# Patient Record
Sex: FEMALE | Race: WHITE | Employment: FULL TIME | URBAN - METROPOLITAN AREA
[De-identification: names, ages, dates, MRNs, and addresses within clinical notes are randomized per-mention and may not be internally consistent; named-entity substitution may affect disease eponyms.]

---

## 2017-01-09 ENCOUNTER — OFFICE VISIT (OUTPATIENT)
Dept: FAMILY MEDICINE CLINIC | Age: 47
End: 2017-01-09

## 2017-01-09 VITALS
WEIGHT: 203 LBS | HEIGHT: 67 IN | OXYGEN SATURATION: 96 % | BODY MASS INDEX: 31.86 KG/M2 | TEMPERATURE: 97.5 F | SYSTOLIC BLOOD PRESSURE: 143 MMHG | HEART RATE: 92 BPM | RESPIRATION RATE: 16 BRPM | DIASTOLIC BLOOD PRESSURE: 73 MMHG

## 2017-01-09 DIAGNOSIS — Z72.0 TOBACCO ABUSE DISORDER: ICD-10-CM

## 2017-01-09 DIAGNOSIS — E11.9 TYPE 2 DIABETES MELLITUS WITHOUT COMPLICATION, WITH LONG-TERM CURRENT USE OF INSULIN (HCC): ICD-10-CM

## 2017-01-09 DIAGNOSIS — Z79.4 TYPE 2 DIABETES MELLITUS WITHOUT COMPLICATION, WITH LONG-TERM CURRENT USE OF INSULIN (HCC): ICD-10-CM

## 2017-01-09 DIAGNOSIS — E11.9 TYPE 2 DIABETES MELLITUS WITHOUT COMPLICATION, WITH LONG-TERM CURRENT USE OF INSULIN (HCC): Primary | ICD-10-CM

## 2017-01-09 DIAGNOSIS — M17.0 PRIMARY OSTEOARTHRITIS OF BOTH KNEES: ICD-10-CM

## 2017-01-09 DIAGNOSIS — Z79.4 TYPE 2 DIABETES MELLITUS WITHOUT COMPLICATION, WITH LONG-TERM CURRENT USE OF INSULIN (HCC): Primary | ICD-10-CM

## 2017-01-09 LAB — HBA1C MFR BLD HPLC: 6.8 %

## 2017-01-09 NOTE — MR AVS SNAPSHOT
Visit Information Date & Time Provider Department Dept. Phone Encounter #  
 1/9/2017  3:20 PM Adwoa Sprague MD [facility] [place] at [address] [id number] Follow-up Instructions Return in about 3 months (around 4/9/2017), or if symptoms worsen or fail to improve. Your Appointments 1/9/2017  3:20 PM  
ROUTINE CARE with Adwoa Sprague MD  
[facility] [place] at [facility] Appt Note: 2m fu; r/s  
 [address] [address] [address] [facility] Upcoming Health Maintenance Date Due  
 LIPID PANEL Q1 1970 FOOT EXAM Q1 7/24/1980 EYE EXAM RETINAL OR DILATED Q1 7/24/1980 PAP AKA CERVICAL CYTOLOGY 7/24/1991 HEMOGLOBIN A1C Q6M 3/21/2017 MICROALBUMIN Q1 10/20/2017 DTaP/Tdap/Td series (2 - Td) 10/20/2026 Allergies as of 1/9/2017  Review Complete On: 1/9/2017 By: Adwoa Sprague MD  
  
 Severity Noted Reaction Type Reactions Pcn [Penicillins]  09/21/2016    Hives Current Immunizations  Never Reviewed No immunizations on file. Not reviewed this visit You Were Diagnosed With   
  
 Codes Comments Type 2 diabetes mellitus without complication, with long-term current use of insulin (HCC)    -  Primary ICD-10-CM: E11.9, Z79.4 ICD-9-CM: 250.00, V58.67 Primary osteoarthritis of both knees     ICD-10-CM: M17.0 ICD-9-CM: 715.16 Tobacco abuse disorder     ICD-10-CM: Z72.0 ICD-9-CM: 305.1 Vitals BP Pulse Temp Resp Height(growth percentile) Weight(growth percentile) 143/73 (BP 1 Location: Left arm, BP Patient Position: Sitting) 92 97.5 °F (36.4 °C) (Oral) 16 5' 6.8\" (1.697 m) 203 lb (92.1 kg) SpO2 BMI OB Status Smoking Status 96% 31.98 kg/m2 Premenopausal Current Every Day Smoker Vitals History BMI and BSA Data Body Mass Index Body Surface Area 31.98 kg/m 2 2.08 m 2 Preferred Pharmacy Pharmacy Name Phone [facility] [address], [address] [facility]. [phone] Your Updated Medication List  
  
   
This list is accurate as of: 1/9/17  2:54 PM.  Always use your most recent med list.  
  
  
  
  
 glucose blood VI test strips strip Commonly known as:  FREESTYLE INSULINX TEST STRIPS Test blood sugars twice daily  Diagnosis E 11.65  
  
 insulin glargine 300 unit/mL (1.5 mL) Inpn Commonly known as:  TOUJEO SOLOSTAR  
35 Units by SubCUTAneous route daily. Insulin Syringe-Needle U-100 0.5 mL 29 gauge x 1/2\" Syrg Commonly known as:  INSULIN SYRINGE ULTRAFINE Use as instructed Lancets Misc Use as instructed  
  
 metFORMIN 1,000 mg tablet Commonly known as:  GLUCOPHAGE Take 1 Tab by mouth two (2) times daily (with meals). We Performed the Following AMB POC HEMOGLOBIN A1C [96619 CPT(R)]  DIABETES EYE EXAM [HM6 Custom]  DIABETES FOOT EXAM [HM7 Custom] REFERRAL TO PHYSICAL THERAPY [TZB81 Custom] Comments:  
 Please evaluate patient for:  bilat knee oa Follow-up Instructions Return in about 3 months (around 4/9/2017), or if symptoms worsen or fail to improve. To-Do List   
 01/09/2017 Lab:  LIPID PANEL   
  
 01/09/2017 Lab:  METABOLIC PANEL, COMPREHENSIVE   
  
 01/09/2017 Imaging:  XR KNEE LT MAX 2 VWS   
  
 01/09/2017 Imaging:  XR KNEE RT MAX 2 VWS Referral Information Referral ID Referred By Referred To  
  
 [id number] [place] Not Available Visits Status Start Date End Date 1 New Request 1/9/17 1/9/18 If your referral has a status of pending review or denied, additional information will be sent to support the outcome of this decision. Introducing [facility] & HEALTH SERVICES!    
 Estefania Wynn introduces [employer] patient portal. Now you can access parts of your medical record, email your doctor's office, and request medication refills online. 1. In your internet browser, go to [email or web address] 2. Click on the First Time User? Click Here link in the Sign In box. You will see the New Member Sign Up page. 3. Enter your Amplify[employer]LA Access Code exactly as it appears below. You will not need to use this code after youve completed the sign-up process. If you do not sign up before the expiration date, you must request a new code. · Am[employer] Access Code: [email or web address] Expires: 4/9/2017  2:37 PM 
 
4. Enter the last four digits of your Social Security Number (xxxx) and Date of Birth (mm/dd/yyyy) as indicated and click Submit. You will be taken to the next sign-up page. 5. Create a Amplify.LA ID. This will be your Amplify[employer] login ID and cannot be changed, so think of one that is secure and easy to remember. 6. Create a Amplify[employer]LA password. You can change your password at any time. 7. Enter your Password Reset Question and Answer. This can be used at a later time if you forget your password. 8. Enter your e-mail address. You will receive e-mail notification when new information is available in [address] 9. Click Sign Up. You can now view and download portions of your medical record. 10. Click the Download Summary menu link to download a portable copy of your medical information. If you have questions, please visit the Frequently Asked Questions section of the Am[employer] website. Remember, [employer] is NOT to be used for urgent needs. For medical emergencies, dial 911. Now available from your iPhone and Android! Please provide this summary of care documentation to your next provider. Your primary care clinician is listed as NONE. If you have any questions after today's visit, please call [phone].

## 2017-01-09 NOTE — PROGRESS NOTES
Chief Complaint   Patient presents with    Diabetes     3 month follow-up   Discuss knee pain x 1 month   Room 4

## 2017-01-09 NOTE — PROGRESS NOTES
Subjective:     Chief Complaint   Patient presents with    Diabetes     3 month follow-up      She  is a 55 y.o. female who presents for evaluation of:     Diabetes Mellitus: Newer Diagnosis  Doing excellent on Toujeo and Metformin  Reports no polyuria or polydipsia, no chest pain, dyspnea or TIA's, no numbness, tingling or pain in extremities, last eye exam approximately > 1 yr ago. Exercises regularly with walking. Compliant with diabetic diet. Avoids sodas and sweet teas. Avoids fast food. Limits carbs. Pt is a smoker but working on quitting - smoking 1/2-1ppd now. Drinks EtOH about 3-6 drinks over weekend. Lab Results   Component Value Date/Time    Hemoglobin A1c (POC) 6.8 01/09/2017 02:45 PM    Hemoglobin A1c 10.9 09/21/2016 07:06 PM    Microalb/Creat ratio (ug/mg creat.) <3.8 10/20/2016 02:38 PM    Creatinine 0.66 09/26/2016 05:34 AM      Lab Results   Component Value Date/Time    GFR est AA >60 09/26/2016 05:34 AM    GFR est non-AA >60 09/26/2016 05:34 AM      No results found for: TSH, TSHEXT, TSHEXT      ROS  Gen - no fever/chills  Resp - no dyspnea or cough  CV - no chest pain or BAR  Msk - Knee pain x 1 month, long hx of dance classes with no specific major injury, no giving way, pain is mostly with stairs and is anterior and superior. Rest per HPI    Past Medical History   Diagnosis Date    Chronic obstructive pulmonary disease ([address]      possible, put on a rtrial of advair - no PFT's    [facility]     H/O seasonal allergies      History reviewed. No pertinent past surgical history. Current Outpatient Prescriptions on File Prior to Visit   Medication Sig Dispense Refill    metFORMIN (GLUCOPHAGE) 1,000 mg tablet Take 1 Tab by mouth two (2) times daily (with meals). 60 Tab 1    insulin glargine (TOUJEO SOLOSTAR) 300 unit/mL (1.5 mL) inpn 35 Units by SubCUTAneous route daily. (Patient taking differently: 38 Units by SubCUTAneous route daily. ) 1 Adjustable Dose Pre-filled Pen Syringe 1  Lancets misc Use as instructed 1 Package 1    glucose blood VI test strips (FREESTYLE INSULINX TEST STRIPS) strip Test blood sugars twice daily  Diagnosis E 11.65 300 Strip 1    Insulin Syringe-Needle U-100 (INSULIN SYRINGE ULTRAFINE) 0.5 mL 29 gauge x 1/2\" syrg Use as instructed 100 Syringe 1     No current facility-administered medications on file prior to visit. Objective:     Vitals:    01/09/17 1429   BP: 143/73   Pulse: 92   Resp: 16   Temp: 97.5 °F (36.4 °C)   TempSrc: Oral   SpO2: 96%   Weight: 203 lb (92.1 kg)   Height: 5' 6.8\" (1.697 m)     Physical Examination:  General appearance - alert, well appearing, and in no distress  Eyes -sclera anicteric  Neck - supple, no significant adenopathy, no thyromegaly, no bruits  Chest - clear to auscultation, no wheezes, rales or rhonchi, symmetric air entry  Heart - normal rate, regular rhythm, normal S1, S2, no murmurs, rubs, clicks or gallops  Neurological - alert, oriented, no focal findings or movement disorder noted  Extr - no edema, Bilat knees with mild crepitus, FROM, neg McMurrays, lachmans, and post drawer, no joint line tenderness    Assessment/ Plan:   Alfred Jasso was seen today for diabetes. Diagnoses and all orders for this visit:    Type 2 diabetes mellitus without complication, with long-term current use of insulin ([address] - great control since new dx. Eye exam to be done by brother in law who is an optometrist  -     AMB POC HEMOGLOBIN Y9G  -     1 [facility]; Future  -     LIPID PANEL; Future  -      DIABETES EYE EXAM  -      DIABETES FOOT EXAM    Primary osteoarthritis of both knees - x-rays, NSAIDs, PT  -     XR KNEE LT MAX 2 VWS; Future  -     XR KNEE RT MAX 2 VWS; Future  -     REFERRAL TO PHYSICAL THERAPY    Tobacco abuse disorder - ct working on smoking cessation    I have discussed the diagnosis with the patient and the intended plan as seen in the above orders.   The patient has received an after-visit summary and questions were answered concerning future plans. I have discussed medication side effects and warnings with the patient as well. The patient verbalizes understanding and agreement with the plan. Follow-up Disposition:  Return in about 3 months (around 4/9/2017), or if symptoms worsen or fail to improve.

## 2017-01-27 DIAGNOSIS — Z79.4 TYPE 2 DIABETES MELLITUS WITHOUT COMPLICATION, WITH LONG-TERM CURRENT USE OF INSULIN (HCC): ICD-10-CM

## 2017-01-27 DIAGNOSIS — E11.9 TYPE 2 DIABETES MELLITUS WITHOUT COMPLICATION, WITH LONG-TERM CURRENT USE OF INSULIN (HCC): ICD-10-CM

## 2017-04-04 DIAGNOSIS — E11.9 TYPE 2 DIABETES MELLITUS WITHOUT COMPLICATION, WITH LONG-TERM CURRENT USE OF INSULIN (HCC): ICD-10-CM

## 2017-04-04 DIAGNOSIS — Z79.4 TYPE 2 DIABETES MELLITUS WITHOUT COMPLICATION, WITH LONG-TERM CURRENT USE OF INSULIN (HCC): ICD-10-CM

## 2017-04-04 RX ORDER — METFORMIN HYDROCHLORIDE 1000 MG/1
TABLET ORAL
Qty: 60 TAB | Refills: 0 | Status: SHIPPED | COMMUNITY
Start: 2017-04-04 | End: 2017-05-08 | Stop reason: SDUPTHER

## 2017-05-08 ENCOUNTER — OFFICE VISIT (OUTPATIENT)
Dept: FAMILY MEDICINE CLINIC | Age: 47
End: 2017-05-08

## 2017-05-08 VITALS
RESPIRATION RATE: 16 BRPM | OXYGEN SATURATION: 97 % | BODY MASS INDEX: 31.39 KG/M2 | HEIGHT: 67 IN | HEART RATE: 90 BPM | TEMPERATURE: 98.4 F | DIASTOLIC BLOOD PRESSURE: 70 MMHG | WEIGHT: 200 LBS | SYSTOLIC BLOOD PRESSURE: 116 MMHG

## 2017-05-08 DIAGNOSIS — Z72.0 TOBACCO ABUSE DISORDER: ICD-10-CM

## 2017-05-08 DIAGNOSIS — E66.09 NON MORBID OBESITY DUE TO EXCESS CALORIES: ICD-10-CM

## 2017-05-08 DIAGNOSIS — E11.9 TYPE 2 DIABETES MELLITUS WITHOUT COMPLICATION, WITH LONG-TERM CURRENT USE OF INSULIN (HCC): Primary | ICD-10-CM

## 2017-05-08 DIAGNOSIS — S46.211A BICEPS STRAIN, RIGHT, INITIAL ENCOUNTER: ICD-10-CM

## 2017-05-08 DIAGNOSIS — Z79.4 TYPE 2 DIABETES MELLITUS WITHOUT COMPLICATION, WITH LONG-TERM CURRENT USE OF INSULIN (HCC): Primary | ICD-10-CM

## 2017-05-08 LAB — HBA1C MFR BLD HPLC: 6.3 %

## 2017-05-08 RX ORDER — METFORMIN HYDROCHLORIDE 1000 MG/1
TABLET ORAL
Qty: 60 TAB | Refills: 5 | Status: SHIPPED | OUTPATIENT
Start: 2017-05-08 | End: 2017-09-08 | Stop reason: SDUPTHER

## 2017-05-08 NOTE — PROGRESS NOTES
Chief Complaint   Patient presents with    Diabetes     3 month follow-up   Mother passed away five weeks ago  Discuss right arm pain with movement  Room 4

## 2017-05-08 NOTE — PROGRESS NOTES
Subjective:     Chief Complaint   Patient presents with    Diabetes     3 month follow-up      She  is a 55 y.o. female who presents for evaluation of:     Doing ok today. Mom  recently after long werner with Alzheimers Dementia. Diabetes Mellitus: Newer Diagnosis  Doing excellent on Toujeo and Metformin  Reports no polyuria or polydipsia, no chest pain, dyspnea or TIA's, no numbness, tingling or pain in extremities, last eye exam approximately > 1 yr ago. Exercises regularly with walking. Compliant with diabetic diet. Avoids sodas and sweet teas. Avoids fast food. Limits carbs. Pt is a smoker but working on quitting - smoking 1/2-1ppd now. Drinks EtOH about 3-6 drinks over weekend. Lab Results   Component Value Date/Time    Hemoglobin A1c (POC) 6.3 2017 04:20 PM    Hemoglobin A1c (POC) 6.8 2017 02:45 PM    Hemoglobin A1c 10.9 2016 07:06 PM    Microalb/Creat ratio (ug/mg creat.) <3.8 10/20/2016 02:38 PM    Creatinine 0.66 2016 05:34 AM      Lab Results   Component Value Date/Time    GFR est AA >60 2016 05:34 AM    GFR est non-AA >60 2016 05:34 AM      No results found for: TSH, TSHEXT, TSHEXT      ROS  Gen - no fever/chills  Resp - no dyspnea or cough  CV - no chest pain or BAR  Msk - R biceps pain x 4 days. Better with rest and ibuprofen. No injury. Rest per HPI    Past Medical History:   Diagnosis Date    Chronic obstructive [facility] disease ([facility]     possible, put on a rtrial of advair - no PFT's    Diabetes ([facility] [facility]     H/O seasonal allergies      History reviewed. No pertinent surgical history. Current Outpatient Prescriptions on File Prior to Visit   Medication Sig Dispense Refill    insulin glargine (TOUJEO SOLOSTAR) 300 unit/mL (1.5 mL) inpn 38 Units by SubCUTAneous route daily.  1 Adjustable Dose Pre-filled Pen Syringe 5    Insulin Syringe-Needle U-100 (INSULIN SYRINGE ULTRAFINE) 0.5 mL 29 gauge x 1/2\" syrg Use as instructed 100 Syringe 1    Lancets misc Use as instructed 1 Package 1    glucose blood VI test strips (FREESTYLE INSULINX TEST STRIPS) strip Test blood sugars twice daily  Diagnosis E 11.65 300 Strip 1     No current facility-administered medications on file prior to visit. Objective:     Vitals:    05/08/17 1611   BP: 116/70   Pulse: 90   Resp: 16   Temp: 98.4 °F (36.9 °C)   TempSrc: Oral   SpO2: 97%   Weight: 200 lb (90.7 kg)   Height: 5' 6.8\" (1.697 m)     Physical Examination:  General appearance - alert, well appearing, and in no distress  Eyes -sclera anicteric  Neck - supple, no significant adenopathy, no thyromegaly, no bruits  Chest - clear to auscultation, no wheezes, rales or rhonchi, symmetric air entry  Heart - normal rate, regular rhythm, normal S1, S2, no murmurs, rubs, clicks or gallops  Neurological - alert, oriented, no focal findings or movement disorder noted  Extr - no edema  Msk - mild ttp over bicipital groove, mild pain with bicep flexion, nml ROM    Assessment/ Plan:   Toribio Del Toro was seen today for diabetes. Diagnoses and all orders for this visit:    Type 2 diabetes mellitus without complication, with long-term current use of insulin (HCC) -excellent control with Metformin and Toujeo  -     AMB POC HEMOGLOBIN A1C  -     metFORMIN (GLUCOPHAGE) 1,000 mg tablet; TAKE ONE TABLET BY MOUTH TWICE A DAY WITH MEALS    Tobacco abuse disorder - working on this    Biceps strain, right, initial encounter - rest and NSAIDs prn    Non morbid obesity due to excess calories - working on exercise and diet    I have discussed the diagnosis with the patient and the intended plan as seen in the above orders. The patient has received an after-visit summary and questions were answered concerning future plans. I have discussed medication side effects and warnings with the patient as well. The patient verbalizes understanding and agreement with the plan.     Follow-up Disposition:  Return in about 3 months (around 8/8/2017), or if symptoms worsen or fail to improve.

## 2017-05-08 NOTE — MR AVS SNAPSHOT
Visit Information Date & Time Provider Department Dept. Phone Encounter #  
 5/8/2017  4:00 PM Shawnee Hunter MD [facility] at [address] [id number] Upcoming Health Maintenance Date Due  
 LIPID PANEL Q1 1970 EYE EXAM RETINAL OR DILATED Q1 7/24/1980 PAP AKA CERVICAL CYTOLOGY 7/24/1991 HEMOGLOBIN A1C Q6M 7/9/2017 INFLUENZA AGE 9 TO ADULT 8/1/2017 MICROALBUMIN Q1 10/20/2017 FOOT EXAM Q1 1/9/2018 DTaP/Tdap/Td series (2 - Td) 10/20/2026 Allergies as of 5/8/2017  Review Complete On: 5/8/2017 By: Shawnee Hunter MD  
  
 Severity Noted Reaction Type Reactions Pcn [Penicillins]  09/21/2016    Hives Current Immunizations  Never Reviewed No immunizations on file. Not reviewed this visit You Were Diagnosed With   
  
 Codes Comments Type 2 diabetes mellitus without complication, with long-term current use of insulin (HCC)    -  Primary ICD-10-CM: E11.9, Z79.4 ICD-9-CM: 250.00, V58.67 Tobacco abuse disorder     ICD-10-CM: Z72.0 ICD-9-CM: 305.1 Vitals BP Pulse Temp Resp Height(growth percentile) Weight(growth percentile) 116/70 (BP 1 Location: Left arm, BP Patient Position: Sitting) 90 98.4 °F (36.9 °C) (Oral) 16 5' 6.8\" (1.697 m) 200 lb (90.7 kg) SpO2 BMI OB Status Smoking Status 97% 31.51 kg/m2 Premenopausal Current Every Day Smoker Vitals History BMI and BSA Data Body Mass Index Body Surface Area  
 31.51 kg/m 2 2.07 m 2 Preferred Pharmacy Pharmacy Name Phone [facility] [address], [address] [place] [address]. [phone] Your Updated Medication List  
  
   
This list is accurate as of: 5/8/17  5:01 PM.  Always use your most recent med list.  
  
  
  
  
 glucose blood VI test strips strip Commonly known as:  FREESTYLE INSULINX TEST STRIPS Test blood sugars twice daily  Diagnosis E 11.65  
  
 insulin glargine 300 unit/mL (1.5 mL) Inpn Commonly known as:  TOUJEO SOLOSTAR  
38 Units by SubCUTAneous route daily. Insulin Syringe-Needle U-100 0.5 mL 29 gauge x 1/2\" Syrg Commonly known as:  INSULIN SYRINGE ULTRAFINE Use as instructed Lancets Misc Use as instructed  
  
 metFORMIN 1,000 mg tablet Commonly known as:  GLUCOPHAGE  
TAKE ONE TABLET BY MOUTH TWICE A DAY WITH MEALS Prescriptions Sent to Pharmacy Refills  
 metFORMIN (GLUCOPHAGE) 1,000 mg tablet 5 Sig: TAKE ONE TABLET BY MOUTH TWICE A DAY WITH MEALS Class: Normal  
 Pharmacy: [facility] [address] [place], [address] [place] [address] RD.  #: [phone] We Performed the Following AMB POC HEMOGLOBIN A1C [02180 CPT(R)] Introducing [facility] & HEALTH SERVICES! [address] [facility] introduces [employer] patient portal. Now you can access parts of your medical record, email your doctor's office, and request medication refills online. 1. In your internet browser, go to [email or web address] 2. Click on the First Time User? Click Here link in the Sign In box. You will see the New Member Sign Up page. 3. Enter your [employer] Access Code exactly as it appears below. You will not need to use this code after youve completed the sign-up process. If you do not sign up before the expiration date, you must request a new code. · [employer] Access Code: [email or web address] Expires: 8/6/2017  5:01 PM 
 
4. Enter the last four digits of your Social Security Number (xxxx) and Date of Birth (mm/dd/yyyy) as indicated and click Submit. You will be taken to the next sign-up page. 5. Create a [employer]ot ID. This will be your [employer] login ID and cannot be changed, so think of one that is secure and easy to remember. 6. Create a [employer] password. You can change your password at any time. 7. Enter your Password Reset Question and Answer. This can be used at a later time if you forget your password. 8. Enter your e-mail address. You will receive e-mail notification when new information is available in [address] 9. Click Sign Up. You can now view and download portions of your medical record. 10. Click the Download Summary menu link to download a portable copy of your medical information. If you have questions, please visit the Frequently Asked Questions section of the [employer] website. Remember, [employer] is NOT to be used for urgent needs. For medical emergencies, dial 911. Now available from your iPhone and Android! Please provide this summary of care documentation to your next provider. Your primary care clinician is listed as Corinna James. If you have any questions after today's visit, please call [phone].

## 2017-08-21 ENCOUNTER — OFFICE VISIT (OUTPATIENT)
Dept: FAMILY MEDICINE CLINIC | Age: 47
End: 2017-08-21

## 2017-08-21 VITALS
HEIGHT: 67 IN | RESPIRATION RATE: 16 BRPM | DIASTOLIC BLOOD PRESSURE: 64 MMHG | TEMPERATURE: 98.7 F | SYSTOLIC BLOOD PRESSURE: 102 MMHG | OXYGEN SATURATION: 96 % | WEIGHT: 203 LBS | HEART RATE: 99 BPM | BODY MASS INDEX: 31.86 KG/M2

## 2017-08-21 DIAGNOSIS — Z72.0 TOBACCO ABUSE DISORDER: ICD-10-CM

## 2017-08-21 DIAGNOSIS — M67.441 GANGLION CYST OF FINGER OF RIGHT HAND: ICD-10-CM

## 2017-08-21 DIAGNOSIS — Z79.4 TYPE 2 DIABETES MELLITUS WITHOUT COMPLICATION, WITH LONG-TERM CURRENT USE OF INSULIN (HCC): Primary | ICD-10-CM

## 2017-08-21 DIAGNOSIS — E11.9 TYPE 2 DIABETES MELLITUS WITHOUT COMPLICATION, WITH LONG-TERM CURRENT USE OF INSULIN (HCC): Primary | ICD-10-CM

## 2017-08-21 DIAGNOSIS — Z00.00 WELL ADULT EXAM: ICD-10-CM

## 2017-08-21 DIAGNOSIS — Z79.899 ENCOUNTER FOR LONG-TERM (CURRENT) USE OF MEDICATIONS: ICD-10-CM

## 2017-08-21 DIAGNOSIS — E66.09 NON MORBID OBESITY DUE TO EXCESS CALORIES: ICD-10-CM

## 2017-08-21 NOTE — PROGRESS NOTES
Subjective:     Chief Complaint   Patient presents with    Diabetes     3 month follow-up      She  is a 52 y.o. female who presents for evaluation of:     Doing ok today. Diabetes Mellitus: Doing excellent on Toujeo and Metformin  Reports no polyuria or polydipsia, no chest pain, dyspnea or TIA's, no numbness, tingling or pain in extremities, last eye exam approximately > 1 yr ago. Exercises regularly with walking. Compliant with diabetic diet. Avoids sodas and sweet teas. Avoids fast food. Limits carbs. Pt is a smoker but working on quitting - smoking 1/2-1ppd now. Drinks EtOH about 3-6 drinks over weekend. Lab Results   Component Value Date/Time    Hemoglobin A1c (POC) 6.3 05/08/2017 04:20 PM    Hemoglobin A1c (POC) 6.8 01/09/2017 02:45 PM    Hemoglobin A1c 10.9 09/21/2016 07:06 PM    Microalb/Creat ratio (ug/mg creat.) <3.8 10/20/2016 02:38 PM    Creatinine 0.66 09/26/2016 05:34 AM      Lab Results   Component Value Date/Time    GFR est AA >60 09/26/2016 05:34 AM    GFR est non-AA >60 09/26/2016 05:34 AM      No results found for: TSH, TSHEXT, TSHEXT      ROS  Gen - no fever/chills  Resp - no dyspnea or cough  CV - no chest pain or BAR  Rest per HPI    Past Medical History:   Diagnosis Date    Chronic obstructive pulmonary disease ([facility]     possible, put on a rtrial of advair - no PFT's    Diabetes ([facility]     H/O seasonal allergies      History reviewed. No pertinent surgical history. Current Outpatient Prescriptions on File Prior to Visit   Medication Sig Dispense Refill    metFORMIN (GLUCOPHAGE) 1,000 mg tablet TAKE ONE TABLET BY MOUTH TWICE A DAY WITH MEALS 60 Tab 5    insulin glargine (TOUJEO SOLOSTAR) 300 unit/mL (1.5 mL) inpn 38 Units by SubCUTAneous route daily.  1 Adjustable Dose Pre-filled Pen Syringe 5    Insulin Syringe-Needle U-100 (INSULIN SYRINGE ULTRAFINE) 0.5 mL 29 gauge x 1/2\" syrg Use as instructed 100 Syringe 1    Lancets misc Use as instructed 1 Package 1    glucose blood VI test strips (FREESTYLE INSULINX TEST STRIPS) strip Test blood sugars twice daily  Diagnosis E 11.65 300 Strip 1     No current facility-administered medications on file prior to visit. Objective:     Vitals:    08/21/17 1527   BP: 102/64   Pulse: 99   Resp: 16   Temp: 98.7 °F (37.1 °C)   TempSrc: Oral   SpO2: 96%   Weight: 203 lb (92.1 kg)   Height: 5' 6.8\" (1.697 m)     Physical Examination:  General appearance - alert, well appearing, and in no distress  Eyes -sclera anicteric  Neck - supple, no significant adenopathy, no thyromegaly  Chest - clear to auscultation, no wheezes, rales or rhonchi, symmetric air entry  Heart - normal rate, regular rhythm, normal S1, S2, no murmurs, rubs, clicks or gallops  Neurological - alert, oriented, normal speech, no focal findings or movement disorder noted  Feet - normal exam with normal sensation including vibration, no skin breakdown  Extr - small cyst on flexor or right 4th digit    Assessment/ Plan:   Diagnoses and all orders for this visit:    1. Type 2 diabetes mellitus without complication, with long-term current use of insulin (HCC) - excellent control, ct metformin and Toujeo  -     LIPID PANEL  -     HEMOGLOBIN A1C WITH EAG  -     METABOLIC PANEL, COMPREHENSIVE    2. Ganglion cyst of finger of right hand - no sx, monitor    3. Non morbid obesity due to excess calories- working on exercise and diet    4. Tobacco abuse disorder - discussed cessation again    5. Encounter for long-term (current) use of medications  -     LIPID PANEL  -     HEMOGLOBIN A1C WITH EAG  -     METABOLIC PANEL, COMPREHENSIVE    6. Well adult exam - ordering labs and will code for exam at next appt. -     LIPID PANEL  -     HEMOGLOBIN A1C WITH EAG  -     METABOLIC PANEL, COMPREHENSIVE  -     CBC W/O DIFF  -     TSH 3RD GENERATION    I have discussed the diagnosis with the patient and the intended plan as seen in the above orders.   The patient has received an after-visit summary and questions were answered concerning future plans. I have discussed medication side effects and warnings with the patient as well. The patient verbalizes understanding and agreement with the plan. Follow-up Disposition:  Return in about 3 months (around 11/21/2017), or if symptoms worsen or fail to improve.

## 2017-08-21 NOTE — MR AVS SNAPSHOT
Visit Information Date & Time Provider Department Dept. Phone Encounter #  
 8/21/2017  3:00 PM Stacie Gallagher MD [facility] at [address] [id number] Follow-up Instructions Return in about 3 months (around 11/21/2017), or if symptoms worsen or fail to improve. Upcoming Health Maintenance Date Due  
 LIPID PANEL Q1 1970 EYE EXAM RETINAL OR DILATED Q1 7/24/1980 PAP AKA CERVICAL CYTOLOGY 7/24/1991 INFLUENZA AGE 9 TO ADULT 8/1/2017 MICROALBUMIN Q1 10/20/2017 HEMOGLOBIN A1C Q6M 11/8/2017 FOOT EXAM Q1 1/9/2018 DTaP/Tdap/Td series (2 - Td) 10/20/2026 Allergies as of 8/21/2017  Review Complete On: 8/21/2017 By: Stacie Gallagher MD  
  
 Severity Noted Reaction Type Reactions Pcn [Penicillins]  09/21/2016    Hives Current Immunizations  Never Reviewed No immunizations on file. Not reviewed this visit You Were Diagnosed With   
  
 Codes Comments Type 2 diabetes mellitus without complication, with long-term current use of insulin (HCC)    -  Primary ICD-10-CM: E11.9, Z79.4 ICD-9-CM: 250.00, V58.67 Ganglion cyst of finger of right hand     ICD-10-CM: M67.441 ICD-9-CM: 727.43 Non morbid obesity due to excess calories     ICD-10-CM: E66.09 
ICD-9-CM: 278.00 Tobacco abuse disorder     ICD-10-CM: Z72.0 ICD-9-CM: 305.1 Encounter for long-term (current) use of medications     ICD-10-CM: Z79.899 ICD-9-CM: V58.69 Well adult exam     ICD-10-CM: Z00.00 ICD-9-CM: V70.0 Vitals BP Pulse Temp Resp Height(growth percentile) Weight(growth percentile) 102/64 (BP 1 Location: Left arm, BP Patient Position: Sitting) 99 98.7 °F (37.1 °C) (Oral) 16 5' 6.8\" (1.697 m) 203 lb (92.1 kg) SpO2 BMI OB Status Smoking Status 96% 31.98 kg/m2 Premenopausal Current Every Day Smoker Vitals History BMI and BSA Data Body Mass Index Body Surface Area  31.98 kg/m 2 2.08 m 2  
  
  
 Preferred Pharmacy Pharmacy Name Phone [address] [facility], [address] [place] [address]. [phone] Your Updated Medication List  
  
   
This list is accurate as of: 8/21/17  4:08 PM.  Always use your most recent med list.  
  
  
  
  
 glucose blood VI test strips strip Commonly known as:  FREESTYLE INSULINX TEST STRIPS Test blood sugars twice daily  Diagnosis E 11.65  
  
 insulin glargine 300 unit/mL (1.5 mL) Inpn Commonly known as:  TOUJEO SOLOSTAR  
38 Units by SubCUTAneous route daily. Insulin Syringe-Needle U-100 0.5 mL 29 gauge x 1/2\" Syrg Commonly known as:  INSULIN SYRINGE ULTRAFINE Use as instructed Lancets Misc Use as instructed  
  
 metFORMIN 1,000 mg tablet Commonly known as:  GLUCOPHAGE  
TAKE ONE TABLET BY MOUTH TWICE A DAY WITH MEALS We Performed the Following CBC W/O DIFF [94435 CPT(R)] HEMOGLOBIN A1C WITH EAG [04747 CPT(R)] LIPID PANEL [68309 CPT(R)] METABOLIC PANEL, COMPREHENSIVE [59612 CPT(R)] TSH 3RD GENERATION [83991 CPT(R)] Follow-up Instructions Return in about 3 months (around 11/21/2017), or if symptoms worsen or fail to improve. Introducing [facility]! [facility] introduces EGEN patient portal. Now you can access parts of your medical record, email your doctor's office, and request medication refills online. 1. In your internet browser, go to [email or web address] 2. Click on the First Time User? Click Here link in the Sign In box. You will see the New Member Sign Up page. 3. Enter your EGEN Access Code exactly as it appears below. You will not need to use this code after youve completed the sign-up process. If you do not sign up before the expiration date, you must request a new code. · EGEN Access Code: [email or web address] Expires: 11/19/2017  4:08 PM 
 
4.  Enter the last four digits of your Social Security Number (xxxx) and Date of Birth (mm/dd/yyyy) as indicated and click Submit. You will be taken to the next sign-up page. 5. Create a [employer] ID. This will be your [employer] login ID and cannot be changed, so think of one that is secure and easy to remember. 6. Create a [employer] password. You can change your password at any time. 7. Enter your Password Reset Question and Answer. This can be used at a later time if you forget your password. 8. Enter your e-mail address. You will receive e-mail notification when new information is available in [address] 9. Click Sign Up. You can now view and download portions of your medical record. 10. Click the Download Summary menu link to download a portable copy of your medical information. If you have questions, please visit the Frequently Asked Questions section of the [employer] website. Remember, [employer] is NOT to be used for urgent needs. For medical emergencies, dial 911. Now available from your iPhone and Android! Please provide this summary of care documentation to your next provider. Your primary care clinician is listed as Elaine Townsend. If you have any questions after today's visit, please call [phone].

## 2017-08-21 NOTE — PROGRESS NOTES
Chief Complaint   Patient presents with    Diabetes     3 month follow-up   Needs pap smear and to schedule eye exam  Bump left hand  Room 4

## 2017-08-29 LAB
ALBUMIN SERPL-MCNC: 4.4 G/DL (ref 3.5–5.5)
ALBUMIN/GLOB SERPL: 1.8 {RATIO} (ref 1.2–2.2)
ALP SERPL-CCNC: 49 IU/L (ref 39–117)
ALT SERPL-CCNC: 20 IU/L (ref 0–32)
AST SERPL-CCNC: 13 IU/L (ref 0–40)
BILIRUB SERPL-MCNC: 1.2 MG/DL (ref 0–1.2)
BUN SERPL-MCNC: 12 MG/DL (ref 6–24)
BUN/CREAT SERPL: 16 (ref 9–23)
CALCIUM SERPL-MCNC: 9.1 MG/DL (ref 8.7–10.2)
CHLORIDE SERPL-SCNC: 103 MMOL/L (ref 96–106)
CHOLEST SERPL-MCNC: 205 MG/DL (ref 100–199)
CO2 SERPL-SCNC: 22 MMOL/L (ref 18–29)
CREAT SERPL-MCNC: 0.77 MG/DL (ref 0.57–1)
ERYTHROCYTE [DISTWIDTH] IN BLOOD BY AUTOMATED COUNT: 13.5 % (ref 12.3–15.4)
EST. AVERAGE GLUCOSE BLD GHB EST-MCNC: 128 MG/DL
GLOBULIN SER CALC-MCNC: 2.4 G/DL (ref 1.5–4.5)
GLUCOSE SERPL-MCNC: 133 MG/DL (ref 65–99)
HBA1C MFR BLD: 6.1 % (ref 4.8–5.6)
HCT VFR BLD AUTO: 44.6 % (ref 34–46.6)
HDLC SERPL-MCNC: 33 MG/DL
HGB BLD-MCNC: 15.2 G/DL (ref 11.1–15.9)
LDLC SERPL CALC-MCNC: 125 MG/DL (ref 0–99)
MCH RBC QN AUTO: 31.6 PG (ref 26.6–33)
MCHC RBC AUTO-ENTMCNC: 34.1 G/DL (ref 31.5–35.7)
MCV RBC AUTO: 93 FL (ref 79–97)
PLATELET # BLD AUTO: 259 X10E3/UL (ref 150–379)
POTASSIUM SERPL-SCNC: 4.4 MMOL/L (ref 3.5–5.2)
PROT SERPL-MCNC: 6.8 G/DL (ref 6–8.5)
RBC # BLD AUTO: 4.81 X10E6/UL (ref 3.77–5.28)
SODIUM SERPL-SCNC: 142 MMOL/L (ref 134–144)
TRIGL SERPL-MCNC: 234 MG/DL (ref 0–149)
TSH SERPL DL<=0.005 MIU/L-ACNC: 3.44 UIU/ML (ref 0.45–4.5)
VLDLC SERPL CALC-MCNC: 47 MG/DL (ref 5–40)
WBC # BLD AUTO: 8.8 X10E3/UL (ref 3.4–10.8)

## 2017-09-08 DIAGNOSIS — E11.9 TYPE 2 DIABETES MELLITUS WITHOUT COMPLICATION, WITH LONG-TERM CURRENT USE OF INSULIN (HCC): ICD-10-CM

## 2017-09-08 DIAGNOSIS — Z79.4 TYPE 2 DIABETES MELLITUS WITHOUT COMPLICATION, WITH LONG-TERM CURRENT USE OF INSULIN (HCC): ICD-10-CM

## 2017-09-08 NOTE — TELEPHONE ENCOUNTER
Pt is out of metformin and toujeo     She would like a refill     Best number to reach her is [phone]

## 2017-09-09 RX ORDER — METFORMIN HYDROCHLORIDE 1000 MG/1
TABLET ORAL
Qty: 60 TAB | Refills: 5 | Status: SHIPPED | OUTPATIENT
Start: 2017-09-09 | End: 2017-11-20 | Stop reason: SDUPTHER

## 2017-09-12 DIAGNOSIS — E11.9 TYPE 2 DIABETES MELLITUS WITHOUT COMPLICATION, WITH LONG-TERM CURRENT USE OF INSULIN (HCC): ICD-10-CM

## 2017-09-12 DIAGNOSIS — Z79.4 TYPE 2 DIABETES MELLITUS WITHOUT COMPLICATION, WITH LONG-TERM CURRENT USE OF INSULIN (HCC): ICD-10-CM

## 2017-09-12 NOTE — TELEPHONE ENCOUNTER
Pt would like refill for lancets sent to [facility]     They texted her that the refill was denied     Best number to reach her is [phone]

## 2017-09-13 RX ORDER — LANCETS
EACH MISCELLANEOUS
Qty: 100 EACH | Refills: 1 | Status: SHIPPED | OUTPATIENT
Start: 2017-09-13 | End: 2017-11-20 | Stop reason: SDUPTHER

## 2017-11-20 ENCOUNTER — OFFICE VISIT (OUTPATIENT)
Dept: FAMILY MEDICINE CLINIC | Age: 47
End: 2017-11-20

## 2017-11-20 VITALS
BODY MASS INDEX: 32.49 KG/M2 | HEART RATE: 94 BPM | SYSTOLIC BLOOD PRESSURE: 122 MMHG | OXYGEN SATURATION: 95 % | DIASTOLIC BLOOD PRESSURE: 78 MMHG | HEIGHT: 67 IN | WEIGHT: 207 LBS | TEMPERATURE: 97.9 F | RESPIRATION RATE: 18 BRPM

## 2017-11-20 DIAGNOSIS — E78.5 HYPERLIPIDEMIA LDL GOAL <100: ICD-10-CM

## 2017-11-20 DIAGNOSIS — F41.9 ANXIETY: ICD-10-CM

## 2017-11-20 DIAGNOSIS — E11.9 TYPE 2 DIABETES MELLITUS WITHOUT COMPLICATION, WITH LONG-TERM CURRENT USE OF INSULIN (HCC): Primary | ICD-10-CM

## 2017-11-20 DIAGNOSIS — Z72.0 TOBACCO ABUSE DISORDER: ICD-10-CM

## 2017-11-20 DIAGNOSIS — Z79.4 TYPE 2 DIABETES MELLITUS WITHOUT COMPLICATION, WITH LONG-TERM CURRENT USE OF INSULIN (HCC): Primary | ICD-10-CM

## 2017-11-20 RX ORDER — ATORVASTATIN CALCIUM 20 MG/1
20 TABLET, FILM COATED ORAL DAILY
Qty: 90 TAB | Refills: 1 | Status: SHIPPED | OUTPATIENT
Start: 2017-11-20 | End: 2018-05-14 | Stop reason: SDUPTHER

## 2017-11-20 RX ORDER — NAPHAZOLINE HYDROCHLORIDE AND POLYETHYLENE GLYCOL 300 .1; 2 MG/ML; MG/ML
SOLUTION/ DROPS OPHTHALMIC
Qty: 100 SYRINGE | Refills: 1 | Status: SHIPPED | OUTPATIENT
Start: 2017-11-20 | End: 2018-02-05 | Stop reason: SDUPTHER

## 2017-11-20 RX ORDER — BUPROPION HYDROCHLORIDE 150 MG/1
TABLET, EXTENDED RELEASE ORAL
Qty: 60 TAB | Refills: 2 | Status: SHIPPED | OUTPATIENT
Start: 2017-11-20 | End: 2018-05-14 | Stop reason: ALTCHOICE

## 2017-11-20 RX ORDER — LANCETS
EACH MISCELLANEOUS
Qty: 100 EACH | Refills: 1 | Status: SHIPPED | OUTPATIENT
Start: 2017-11-20 | End: 2018-09-24 | Stop reason: SDUPTHER

## 2017-11-20 RX ORDER — METFORMIN HYDROCHLORIDE 1000 MG/1
TABLET ORAL
Qty: 60 TAB | Refills: 5 | Status: SHIPPED | OUTPATIENT
Start: 2017-11-20 | End: 2018-10-27 | Stop reason: SDUPTHER

## 2017-11-20 NOTE — PROGRESS NOTES
Chief Complaint   Patient presents with    Diabetes     Patient here for three month follow up on diabetes. Patient states she needs to schedule cpe for pap. Has not been to eye doctor yet and will get flu shot at [place].

## 2017-11-20 NOTE — PROGRESS NOTES
Subjective:     Chief Complaint   Patient presents with    Diabetes      She  is a 52 y.o. female who presents for evaluation of:     Diabetes Mellitus: Doing excellent on Toujeo and Metformin  Reports no polyuria or polydipsia, no chest pain, dyspnea or TIA's, no numbness, tingling or pain in extremities, last eye exam approximately > 1 yr ago. Exercises regularly with walking. Compliant with diabetic diet. Avoids sodas and sweet teas. Avoids fast food. Limits carbs. Pt is a smoker but working on quitting - smoking 1ppd now. Drinks EtOH about 3-6 drinks over weekend. Lab Results   Component Value Date/Time    Hemoglobin A1c (POC) 6.3 05/08/2017 04:20 PM    Hemoglobin A1c (POC) 6.8 01/09/2017 02:45 PM    Hemoglobin A1c 6.1 08/28/2017 11:40 AM    Microalb/Creat ratio (ug/mg creat.) <3.8 10/20/2016 02:38 PM    LDL, calculated 125 08/28/2017 11:40 AM    Creatinine 0.77 08/28/2017 11:40 AM      Lab Results   Component Value Date/Time    GFR est  08/28/2017 11:40 AM    GFR est non-AA 92 08/28/2017 11:40 AM      Lab Results   Component Value Date/Time    TSH 3.440 08/28/2017 11:40 AM       ROS  Gen - no fever/chills  Resp - no dyspnea or cough  CV - no chest pain or BAR  Rest per HPI    Past Medical History:   Diagnosis Date    Chronic obstructive pulmonary disease ([address]     possible, put on a rtrial of advair - no PFT's    Diabetes [facility] [address]     H/O seasonal allergies      History reviewed. No pertinent surgical history. No current outpatient prescriptions on file prior to visit. No current facility-administered medications on file prior to visit.          Objective:     Vitals:    11/20/17 0926   BP: 122/78   Pulse: 94   Resp: 18   Temp: 97.9 °F (36.6 °C)   TempSrc: Oral   SpO2: 95%   Weight: 207 lb (93.9 kg)   Height: 5' 6.8\" (1.697 m)     Physical Examination:  General appearance - alert, well appearing, and in no distress  Eyes -sclera anicteric  Neck - supple, no significant adenopathy, no thyromegaly  Chest - clear to auscultation, no wheezes, rales or rhonchi, symmetric air entry  Heart - normal rate, regular rhythm, normal S1, S2, no murmurs, rubs, clicks or gallops  Neurological - alert, oriented, normal speech, no focal findings or movement disorder noted  Extr - no edema    Assessment/ Plan:   Diagnoses and all orders for this visit:     1. Type 2 diabetes mellitus without complication, with long-term current use of insulin ([facility])still well controlled and rechecking labs soon  -     insulin glargine (TOUJEO SOLOSTAR) 300 unit/mL (1.5 mL) inpn; 38 Units by SubCUTAneous route daily. -     metFORMIN (GLUCOPHAGE) 1,000 mg tablet; TAKE ONE TABLET BY MOUTH TWICE A DAY WITH MEALS  -     Insulin Syringe-Needle U-100 (INSULIN SYRINGE ULTRAFINE) 0.5 mL 29 gauge x 1/2\" syrg; Use as instructed  -     Lancets misc; Test blood sugars twice daily  -     glucose blood VI test strips (FREESTYLE INSULINX TEST STRIPS) strip; Test blood sugars twice daily  Diagnosis E 29.04  -     METABOLIC PANEL, COMPREHENSIVE; Future  -     HEMOGLOBIN A1C WITH EAG; Future  -     LIPID PANEL; Future    2. Hyperlipidemia LDL goal <100 discussed options and will start low-dose of Lipitor daily today. Work on exercise and diet  -     atorvastatin (LIPITOR) 20 mg tablet; Take 1 Tab by mouth daily.  -     METABOLIC PANEL, COMPREHENSIVE; Future  -     HEMOGLOBIN A1C WITH EAG; Future  -     LIPID PANEL; Future    3. Tobacco abuse disorder  4. Anxiety/depression  -     buPROPion SR (WELLBUTRIN SR) 150 mg SR tablet; Take 1 tab by mouth daily x 3 days and then increase to 1 tab twice daily    I have discussed the diagnosis with the patient and the intended plan as seen in the above orders. The patient has received an after-visit summary and questions were answered concerning future plans. I have discussed medication side effects and warnings with the patient as well.   The patient verbalizes understanding and agreement with the plan.    Follow-up Disposition:  Return in about 3 months (around 2/20/2018), or if symptoms worsen or fail to improve.

## 2017-11-20 NOTE — PATIENT INSTRUCTIONS
Bupropion (Zyban, Wellbutrin XL, Wellbutrin SR, Wellbutrin) - (By mouth)   Why this medicine is used:   Treats depression and aids in quitting smoking. Contact a nurse or doctor right away if you have:  · Blistering, peeling, red skin rash  · Thoughts of hurting yourself, worsening depression, severe agitation or confusion  · Fast, slow, or uneven heartbeat, chest pain, trouble breathing, seizures  · Eye pain, vision changes, seeing halos around lights  · Seeing or hearing things that are not there, feeling like people are against you     Common side effects:  · Nausea, vomiting, stomach pain  · Constipation, diarrhea, gas  · Headache, dizziness, or dry mouth  © 2017 [address] Information is for End User's use only and may not be sold, redistributed or otherwise used for commercial purposes.

## 2017-11-20 NOTE — MR AVS SNAPSHOT
Visit Information Date & Time Provider Department Dept. Phone Encounter #  
 11/20/2017  9:10 AM Tsering Craft MD [facility] at [address] [id number] Upcoming Health Maintenance Date Due  
 EYE EXAM RETINAL OR DILATED Q1 7/24/1980 PAP AKA CERVICAL CYTOLOGY 7/24/1991 Influenza Age 5 to Adult 8/1/2017 MICROALBUMIN Q1 10/20/2017 FOOT EXAM Q1 1/9/2018 HEMOGLOBIN A1C Q6M 2/28/2018 LIPID PANEL Q1 8/28/2018 DTaP/Tdap/Td series (2 - Td) 10/20/2026 Allergies as of 11/20/2017  Review Complete On: 11/20/2017 By: Tsering Craft MD  
  
 Severity Noted Reaction Type Reactions Pcn [Penicillins]  09/21/2016    Hives Current Immunizations  Never Reviewed No immunizations on file. Not reviewed this visit You Were Diagnosed With   
  
 Codes Comments Type 2 diabetes mellitus without complication, with long-term current use of insulin (HCC)    -  Primary ICD-10-CM: E11.9, Z79.4 ICD-9-CM: 250.00, V58.67 Tobacco abuse disorder     ICD-10-CM: Z72.0 ICD-9-CM: 305.1 Anxiety     ICD-10-CM: F41.9 ICD-9-CM: 300.00 Vitals BP Pulse Temp Resp Height(growth percentile) Weight(growth percentile) 122/78 (BP 1 Location: Left arm, BP Patient Position: Sitting) 94 97.9 °F (36.6 °C) (Oral) 18 5' 6.8\" (1.697 m) 207 lb (93.9 kg) SpO2 BMI OB Status Smoking Status 95% 32.62 kg/m2 Premenopausal Current Every Day Smoker Vitals History BMI and BSA Data Body Mass Index Body Surface Area  
 32.62 kg/m 2 2.1 m 2 Preferred Pharmacy Pharmacy Name Phone [facility] [address] [phone] Your Updated Medication List  
  
   
This list is accurate as of: 11/20/17 10:03 AM.  Always use your most recent med list.  
  
  
  
  
 buPROPion  mg SR tablet Commonly known as:  Crista Pendleton  
 Take 1 tab by mouth daily x 3 days and then increase to 1 tab twice daily  
  
 glucose blood VI test strips strip Commonly known as:  FREESTYLE INSULINX TEST STRIPS Test blood sugars twice daily  Diagnosis E 11.65  
  
 insulin glargine 300 unit/mL (1.5 mL) Inpn Commonly known as:  TOUJEO SOLOSTAR  
38 Units by SubCUTAneous route daily. Insulin Syringe-Needle U-100 0.5 mL 29 gauge x 1/2\" Syrg Commonly known as:  INSULIN SYRINGE ULTRAFINE Use as instructed Lancets Misc Test blood sugars twice daily  
  
 metFORMIN 1,000 mg tablet Commonly known as:  GLUCOPHAGE  
TAKE ONE TABLET BY MOUTH TWICE A DAY WITH MEALS Prescriptions Sent to Pharmacy Refills  
 insulin glargine (TOUJEO SOLOSTAR) 300 unit/mL (1.5 mL) inpn 5 Si Units by SubCUTAneous route daily. Class: Normal  
 Pharmacy: [address] Ph #: [phone] Route: SubCUTAneous  
 metFORMIN (GLUCOPHAGE) 1,000 mg tablet 5 Sig: TAKE ONE TABLET BY MOUTH TWICE A DAY WITH MEALS Class: Normal  
 Pharmacy: [address] Ph #: [phone] Insulin Syringe-Needle U-100 (INSULIN SYRINGE ULTRAFINE) 0.5 mL 29 gauge x 1/2\" syrg 1 Sig: Use as instructed Class: Normal  
 Pharmacy: [address] Ph #: [phone] Lancets misc 1 Sig: Test blood sugars twice daily Class: Normal  
 Pharmacy: [address] Ph #: [phone]  
 glucose blood VI test strips (FREESTYLE INSULINX TEST STRIPS) strip 1 Sig: Test blood sugars twice daily  Diagnosis E 11.65 Class: Normal  
 Pharmacy: [address] Ph #: [phone]  
 buPROPion SR (WELLBUTRIN SR) 150 mg SR tablet 2 Sig: Take 1 tab by mouth daily x 3 days and then increase to 1 tab twice daily  Class: Normal  
 Pharmacy: [address] [facility], [address] [place] [address] RD. Ph #: [phone] Patient Instructions Bupropion (Zyban, Wellbutrin XL, Wellbutrin SR, Wellbutrin) - (By mouth) Why this medicine is used:  
Treats depression and aids in quitting smoking. Contact a nurse or doctor right away if you have: · Blistering, peeling, red skin rash · Thoughts of hurting yourself, worsening depression, severe agitation or confusion · Fast, slow, or uneven heartbeat, chest pain, trouble breathing, seizures · Eye pain, vision changes, seeing halos around lights · Seeing or hearing things that are not there, feeling like people are against you Common side effects: 
· Nausea, vomiting, stomach pain · Constipation, diarrhea, gas · Headache, dizziness, or dry mouth © 2017 [address]  Information is for End User's use only and may not be sold, redistributed or otherwise used for commercial purposes. Introducing [facility] SERVICES! Vicente Otero introduces [employer] patient portal. Now you can access parts of your medical record, email your doctor's office, and request medication refills online. 1. In your internet browser, go to [email or web address] 2. Click on the First Time User? Click Here link in the Sign In box. You will see the New Member Sign Up page. 3. Enter your Seriosity Access Code exactly as it appears below. You will not need to use this code after youve completed the sign-up process. If you do not sign up before the expiration date, you must request a new code. · Seriosity Access Code: [email or web address] Expires: 2/18/2018 10:03 AM 
 
4. Enter the last four digits of your Social Security Number (xxxx) and Date of Birth (mm/dd/yyyy) as indicated and click Submit. You will be taken to the next sign-up page. 5. Create a Seriosity ID. This will be your Seriosity login ID and cannot be changed, so think of one that is secure and easy to remember. 6. Create a Nugg-it password. You can change your password at any time. 7. Enter your Password Reset Question and Answer. This can be used at a later time if you forget your password. 8. Enter your e-mail address. You will receive e-mail notification when new information is available in [address] 9. Click Sign Up. You can now view and download portions of your medical record. 10. Click the Download Summary menu link to download a portable copy of your medical information. If you have questions, please visit the Frequently Asked Questions section of the Nugg-it website. Remember, Nugg-it is NOT to be used for urgent needs. For medical emergencies, dial 911. Now available from your iPhone and Android! Please provide this summary of care documentation to your next provider. Your primary care clinician is listed as Aaliyah Gillespie. If you have any questions after today's visit, please call [phone].

## 2018-01-20 DIAGNOSIS — E78.5 HYPERLIPIDEMIA LDL GOAL <100: ICD-10-CM

## 2018-01-20 DIAGNOSIS — E11.9 TYPE 2 DIABETES MELLITUS WITHOUT COMPLICATION, WITH LONG-TERM CURRENT USE OF INSULIN (HCC): ICD-10-CM

## 2018-01-20 DIAGNOSIS — Z79.4 TYPE 2 DIABETES MELLITUS WITHOUT COMPLICATION, WITH LONG-TERM CURRENT USE OF INSULIN (HCC): ICD-10-CM

## 2018-02-05 DIAGNOSIS — Z79.4 TYPE 2 DIABETES MELLITUS WITHOUT COMPLICATION, WITH LONG-TERM CURRENT USE OF INSULIN (HCC): ICD-10-CM

## 2018-02-05 DIAGNOSIS — E11.9 TYPE 2 DIABETES MELLITUS WITHOUT COMPLICATION, WITH LONG-TERM CURRENT USE OF INSULIN (HCC): ICD-10-CM

## 2018-02-05 NOTE — TELEPHONE ENCOUNTER
----- Message from Lionel Alvarenga sent at 2/5/2018  2:04 PM EST -----  Regarding: /Refill  Pt requesting refill of Rx\" insulin pen needles\", stated she advised pharmacy to request refill but wanted to send message as well.    [phone]

## 2018-02-06 RX ORDER — NAPHAZOLINE HYDROCHLORIDE AND POLYETHYLENE GLYCOL 300 .1; 2 MG/ML; MG/ML
SOLUTION/ DROPS OPHTHALMIC
Qty: 100 SYRINGE | Refills: 1 | Status: SHIPPED | OUTPATIENT
Start: 2018-02-06 | End: 2018-09-24 | Stop reason: ALTCHOICE

## 2018-02-06 RX ORDER — PEN NEEDLE, DIABETIC 31 GX3/16"
NEEDLE, DISPOSABLE MISCELLANEOUS
Qty: 100 PEN NEEDLE | Refills: 2 | Status: SHIPPED | OUTPATIENT
Start: 2018-02-06 | End: 2018-09-24 | Stop reason: ALTCHOICE

## 2018-04-25 DIAGNOSIS — Z79.4 TYPE 2 DIABETES MELLITUS WITHOUT COMPLICATION, WITH LONG-TERM CURRENT USE OF INSULIN (HCC): ICD-10-CM

## 2018-04-25 DIAGNOSIS — E11.9 TYPE 2 DIABETES MELLITUS WITHOUT COMPLICATION, WITH LONG-TERM CURRENT USE OF INSULIN (HCC): ICD-10-CM

## 2018-04-25 RX ORDER — INSULIN GLARGINE 300 U/ML
INJECTION, SOLUTION SUBCUTANEOUS
Qty: 4.5 ML | Refills: 4 | Status: SHIPPED | OUTPATIENT
Start: 2018-04-25 | End: 2018-10-07 | Stop reason: SDUPTHER

## 2018-05-14 ENCOUNTER — OFFICE VISIT (OUTPATIENT)
Dept: FAMILY MEDICINE CLINIC | Age: 48
End: 2018-05-14

## 2018-05-14 VITALS
SYSTOLIC BLOOD PRESSURE: 117 MMHG | HEIGHT: 67 IN | OXYGEN SATURATION: 96 % | RESPIRATION RATE: 20 BRPM | DIASTOLIC BLOOD PRESSURE: 65 MMHG | WEIGHT: 212.8 LBS | BODY MASS INDEX: 33.4 KG/M2 | HEART RATE: 84 BPM | TEMPERATURE: 99 F

## 2018-05-14 DIAGNOSIS — Z00.00 WELL ADULT EXAM: Primary | ICD-10-CM

## 2018-05-14 DIAGNOSIS — E11.9 TYPE 2 DIABETES MELLITUS WITHOUT COMPLICATION, WITH LONG-TERM CURRENT USE OF INSULIN (HCC): ICD-10-CM

## 2018-05-14 DIAGNOSIS — Z00.00 WELL ADULT EXAM: ICD-10-CM

## 2018-05-14 DIAGNOSIS — Z79.4 TYPE 2 DIABETES MELLITUS WITHOUT COMPLICATION, WITH LONG-TERM CURRENT USE OF INSULIN (HCC): ICD-10-CM

## 2018-05-14 DIAGNOSIS — E78.5 HYPERLIPIDEMIA LDL GOAL <100: ICD-10-CM

## 2018-05-14 DIAGNOSIS — Z72.0 TOBACCO ABUSE DISORDER: ICD-10-CM

## 2018-05-14 LAB — HBA1C MFR BLD HPLC: 7.3 %

## 2018-05-14 RX ORDER — ATORVASTATIN CALCIUM 20 MG/1
20 TABLET, FILM COATED ORAL DAILY
Qty: 90 TAB | Refills: 1 | Status: SHIPPED | OUTPATIENT
Start: 2018-05-14 | End: 2018-12-05 | Stop reason: SDUPTHER

## 2018-05-14 NOTE — PATIENT INSTRUCTIONS
Frozen Shoulder: Care Instructions  Your Care Instructions    Frozen shoulder is stiffness, pain, and trouble moving your shoulder. It may happen after an injury or overuse, or from a disease such as diabetes or a stroke. You may have pain that keeps you from using your shoulder. However, you need to move your shoulder. If you do not move it, it will get more stiff and sore. Your doctor may order an X-ray to make sure there is not another cause for your stiff shoulder. You can treat frozen shoulder with heat, stretching, over-the-counter pain medicines, and physical therapy. Your doctor also may inject medicine into your shoulder to reduce pain and swelling. It can take a year or more to get better. Surgery is almost never needed. Follow-up care is a key part of your treatment and safety. Be sure to make and go to all appointments, and call your doctor if you are having problems. It's also a good idea to know your test results and keep a list of the medicines you take. How can you care for yourself at home? · Take pain medicines exactly as directed. ¨ If the doctor gave you a prescription medicine for pain, take it as prescribed. ¨ If you are not taking a prescription pain medicine, ask your doctor if you can take an over-the-counter medicine. · Put a heating pad set on low or a warm, wet towel wrapped in plastic on your shoulder. The heat may make it easier to stretch your shoulder. · Follow your doctor's advice for stretches and exercises. · Go to physical therapy if your doctor suggests it. · Try these stretching exercises to reduce stiffness if your doctor says it is okay. Do the exercises slowly to avoid injury. Put a warm, wet towel on your shoulder before exercising. Stop any exercise that increases pain. ¨ Pendulum exercise. While leaning forward and holding onto a table or the back of a chair with your good arm, bend at the waist, allowing your affected arm to hang straight down.  Swing the affected arm back and forth like a pendulum, then in circles that start small and gradually grow larger as pain allows. Try this for about 2 or 3 minutes, several times a day. Once pain begins to go away, you can do this exercise while holding a 1- or 2-pound weight. ¨ Wall climbing (to the side). Stand with your side to a wall so that your fingers can just touch it. Then turn so your body is turned slightly toward the wall. Walk the fingers of your injured arm up the wall as high as pain permits. Hold that position for a count of 15 to 30 seconds. Walk your fingers down to the starting position. Repeat 2 to 4 times, trying to reach higher each time. ¨ Wall climbing (to the front). Face a wall, standing so your fingers can just touch it. Walk the fingers of your affected arm up the wall as high as pain permits. Hold that position for a count of 15 to 30 seconds. Slowly walk your fingers to the starting position. Repeat 2 to 4 times, trying to reach higher each time. When should you call for help? Call your doctor now or seek immediate medical care if:  ? · You have severe pain. ? · Your arm is cool or pale or changes color. ? · You have tingling or numbness in your arm. ? Watch closely for changes in your health, and be sure to contact your doctor if:  ? · You have increased pain. ? · You have new pain that develops in another area. For example, you have pain in your arm, hand, or elbow. ? · You do not get better as expected. Where can you learn more? Go to http://tosha-becki.info/. Enter O336 in the search box to learn more about \"Frozen Shoulder: Care Instructions. \"  Current as of: March 21, 2017  Content Version: 11.4  © 3900-0471 [employer]. Care instructions adapted under license by [employer] (which disclaims liability or warranty for this information).  If you have questions about a medical condition or this instruction, always ask your healthcare professional. [address] any warranty or liability for your use of this information.

## 2018-05-14 NOTE — PROGRESS NOTES
Chief Complaint   Patient presents with    Diabetes     5 month follow-up     1. Have you been to the ER, urgent care clinic since your last visit? Hospitalized since your last visit? No    2. Have you seen or consulted any other health care providers outside of the [address] [place] [address] since your last visit? Include any pap smears or colon screening.  Yes When: 5/7/18 Ophthal.    Eye Exam-done 5/7/18 Dr. Nataly Milner ([address] [facility] [id number])  Pap-to be scheduled  Room #7

## 2018-05-14 NOTE — MR AVS SNAPSHOT
Alex Garcia [address] [facility] 
[phone] Patient: Elizabeth Zaragoza MRN: [id number] SWO:[id number] Visit Information Date & Time Provider Department Dept. Phone Encounter #  
 5/14/2018  1:10 PM Jane Concepcion MD [facility] at [address] [id number] Follow-up Instructions Return in about 6 months (around 11/14/2018), or if symptoms worsen or fail to improve, for Regular Follow up. Upcoming Health Maintenance Date Due  
 EYE EXAM RETINAL OR DILATED Q1 7/24/1980 PAP AKA CERVICAL CYTOLOGY 7/24/1991 MICROALBUMIN Q1 10/20/2017 FOOT EXAM Q1 1/9/2018 HEMOGLOBIN A1C Q6M 2/28/2018 Influenza Age 5 to Adult 8/1/2018 LIPID PANEL Q1 8/28/2018 DTaP/Tdap/Td series (2 - Td) 10/20/2026 Allergies as of 5/14/2018  Review Complete On: 5/14/2018 By: Jane Concepcion MD  
  
 Severity Noted Reaction Type Reactions Pcn [Penicillins]  09/21/2016    Hives Current Immunizations  Never Reviewed No immunizations on file. Not reviewed this visit You Were Diagnosed With   
  
 Codes Comments Well adult exam    -  Primary ICD-10-CM: Z00.00 ICD-9-CM: V70.0 Type 2 diabetes mellitus without complication, with long-term current use of insulin (HCC)     ICD-10-CM: E11.9, Z79.4 ICD-9-CM: 250.00, V58.67 Hyperlipidemia LDL goal <100     ICD-10-CM: E78.5 ICD-9-CM: 272.4 Tobacco abuse disorder     ICD-10-CM: Z72.0 ICD-9-CM: 305.1 Vitals BP Pulse Temp Resp Height(growth percentile) Weight(growth percentile)  
 117/65 (BP 1 Location: Right arm, BP Patient Position: Sitting) 84 99 °F (37.2 °C) (Oral) 20 5' 6.8\" (1.697 m) 212 lb 12.8 oz (96.5 kg) SpO2 BMI OB Status Smoking Status 96% 33.53 kg/m2 Premenopausal Current Every Day Smoker Vitals History BMI and BSA Data  Body Mass Index Body Surface Area  
 33.53 kg/m 2 2.13 m 2  
  
  
 Preferred Pharmacy Pharmacy Name Phone [address] [place], [address] [facility]. [phone] Your Updated Medication List  
  
   
This list is accurate as of 5/14/18  1:46 PM.  Always use your most recent med list.  
  
  
  
  
 atorvastatin 20 mg tablet Commonly known as:  LIPITOR Take 1 Tab by mouth daily. buPROPion  mg SR tablet Commonly known as:  Lavella Bugler Take 1 tab by mouth daily x 3 days and then increase to 1 tab twice daily  
  
 glucose blood VI test strips strip Commonly known as:  FREESTYLE INSULINX TEST STRIPS Test blood sugars twice daily  Diagnosis E 11.65 Insulin Needles (Disposable) 32 gauge x 5/32\" Ndle USE TWO TIMES A DAY AS DIRECTED Insulin Syringe-Needle U-100 0.5 mL 29 gauge x 1/2\" Syrg Commonly known as:  INSULIN SYRINGE ULTRAFINE Use as instructed Lancets Misc Test blood sugars twice daily  
  
 metFORMIN 1,000 mg tablet Commonly known as:  GLUCOPHAGE  
TAKE ONE TABLET BY MOUTH TWICE A DAY WITH MEALS TOUJEO SOLOSTAR U-300 INSULIN 300 unit/mL (1.5 mL) Inpn Generic drug:  insulin glargine INJECT 38 UNITS SUBCUTANEOUSLY DAILY Prescriptions Sent to Pharmacy Refills  
 atorvastatin (LIPITOR) 20 mg tablet 1 Sig: Take 1 Tab by mouth daily. Class: Normal  
 Pharmacy: [address] [place], [address] [place] [address] RD. Ph #: [phone] Route: Oral  
  
We Performed the Following  DIABETES FOOT EXAM [[facility]] Follow-up Instructions Return in about 6 months (around 11/14/2018), or if symptoms worsen or fail to improve, for Regular Follow up. To-Do List   
 05/14/2018 Lab:  HEMOGLOBIN A1C WITH EAG   
  
 05/14/2018 Lab:  LIPID PANEL   
  
 05/14/2018 Lab:  METABOLIC PANEL, COMPREHENSIVE   
  
 05/14/2018 Lab:  MICROALBUMIN, UR, RAND W/ MICROALB/CREAT RATIO Patient Instructions Frozen Shoulder: Care Instructions Your Care Instructions Frozen shoulder is stiffness, pain, and trouble moving your shoulder. It may happen after an injury or overuse, or from a disease such as diabetes or a stroke. You may have pain that keeps you from using your shoulder. However, you need to move your shoulder. If you do not move it, it will get more stiff and sore. Your doctor may order an X-ray to make sure there is not another cause for your stiff shoulder. You can treat frozen shoulder with heat, stretching, over-the-counter pain medicines, and physical therapy. Your doctor also may inject medicine into your shoulder to reduce pain and swelling. It can take a year or more to get better. Surgery is almost never needed. Follow-up care is a key part of your treatment and safety. Be sure to make and go to all appointments, and call your doctor if you are having problems. It's also a good idea to know your test results and keep a list of the medicines you take. How can you care for yourself at home? · Take pain medicines exactly as directed. ¨ If the doctor gave you a prescription medicine for pain, take it as prescribed. ¨ If you are not taking a prescription pain medicine, ask your doctor if you can take an over-the-counter medicine. · Put a heating pad set on low or a warm, wet towel wrapped in plastic on your shoulder. The heat may make it easier to stretch your shoulder. · Follow your doctor's advice for stretches and exercises. · Go to physical therapy if your doctor suggests it. · Try these stretching exercises to reduce stiffness if your doctor says it is okay. Do the exercises slowly to avoid injury. Put a warm, wet towel on your shoulder before exercising. Stop any exercise that increases pain. ¨ Pendulum exercise.  While leaning forward and holding onto a table or the back of a chair with your good arm, bend at the waist, allowing your affected arm to hang straight down. Swing the affected arm back and forth like a pendulum, then in circles that start small and gradually grow larger as pain allows. Try this for about 2 or 3 minutes, several times a day. Once pain begins to go away, you can do this exercise while holding a 1- or 2-pound weight. ¨ Wall climbing (to the side). Stand with your side to a wall so that your fingers can just touch it. Then turn so your body is turned slightly toward the wall. Walk the fingers of your injured arm up the wall as high as pain permits. Hold that position for a count of 15 to 30 seconds. Walk your fingers down to the starting position. Repeat 2 to 4 times, trying to reach higher each time. ¨ Wall climbing (to the front). Face a wall, standing so your fingers can just touch it. Walk the fingers of your affected arm up the wall as high as pain permits. Hold that position for a count of 15 to 30 seconds. Slowly walk your fingers to the starting position. Repeat 2 to 4 times, trying to reach higher each time. When should you call for help? Call your doctor now or seek immediate medical care if: 
? · You have severe pain. ? · Your arm is cool or pale or changes color. ? · You have tingling or numbness in your arm. ? Watch closely for changes in your health, and be sure to contact your doctor if: 
? · You have increased pain. ? · You have new pain that develops in another area. For example, you have pain in your arm, hand, or elbow. ? · You do not get better as expected. Where can you learn more? Go to http://tosha-becki.info/. Enter P638 in the search box to learn more about \"Frozen Shoulder: Care Instructions. \" Current as of: March 21, 2017 Content Version: 11.4 © 4846-5539 [employer]. Care instructions adapted under license by [employer] (which disclaims liability or warranty for this information).  If you have questions about a medical condition or this instruction, always ask your healthcare professional. [place] [address] any warranty or liability for your use of this information. Introducing \[facility]\"" & HEALTH SERVICES! Roger Larsen introduces 2d2c patient portal. Now you can access parts of your medical record, email your doctor's office, and request medication refills online. 1. In your internet browser, go to [email or web address] 2. Click on the First Time User? Click Here link in the Sign In box. You will see the New Member Sign Up page. 3. Enter your 2d2c Access Code exactly as it appears below. You will not need to use this code after youve completed the sign-up process. If you do not sign up before the expiration date, you must request a new code. · 2d2c Access Code: [email or web address] Expires: 8/12/2018  1:28 PM 
 
4. Enter the last four digits of your Social Security Number (xxxx) and Date of Birth (mm/dd/yyyy) as indicated and click Submit. You will be taken to the next sign-up page. 5. Create a 2d2c ID. This will be your 2d2c login ID and cannot be changed, so think of one that is secure and easy to remember. 6. Create a 2d2c password. You can change your password at any time. 7. Enter your Password Reset Question and Answer. This can be used at a later time if you forget your password. 8. Enter your e-mail address. You will receive e-mail notification when new information is available in [address] 9. Click Sign Up. You can now view and download portions of your medical record. 10. Click the Download Summary menu link to download a portable copy of your medical information. If you have questions, please visit the Frequently Asked Questions section of the 2d2c website. Remember, 2d2c is NOT to be used for urgent needs. For medical emergencies, dial 911. Now available from your iPhone and Android! Please provide this summary of care documentation to your next provider. Your primary care clinician is listed as Merlin Ivan. If you have any questions after today's visit, please call [phone].

## 2018-05-14 NOTE — PROGRESS NOTES
Subjective:     Chief Complaint   Patient presents with    Diabetes     5 month follow-up        She  is a 52 y.o. female who presents for evaluation of: CPE  Doing well today. Diabetes Mellitus: Doing ok on Toujeo 45 units and Metformin  Reports no polyuria or polydipsia, no chest pain, dyspnea or TIA's, no numbness, tingling or pain in extremities, last eye exam approximately > 1 yr ago. Exercises regularly with walking. Compliant with diabetic diet. Avoids sodas and sweet teas. Avoids fast food. Limits carbs. Pt is a smoker but working on quitting - smoking 1ppd now. Drinks EtOH about 3-6 drinks over weekend. Lab Results   Component Value Date/Time    Hemoglobin A1c (POC) 7.3 05/14/2018 02:00 PM    Hemoglobin A1c (POC) 6.3 05/08/2017 04:20 PM    Hemoglobin A1c 6.1 (H) 08/28/2017 11:40 AM    Microalb/Creat ratio (ug/mg creat.) <3.8 10/20/2016 02:38 PM    LDL, calculated 125 (H) 08/28/2017 11:40 AM    Creatinine 0.77 08/28/2017 11:40 AM      Lab Results   Component Value Date/Time    GFR est  08/28/2017 11:40 AM    GFR est non-AA 92 08/28/2017 11:40 AM      Lab Results   Component Value Date/Time    TSH 3.440 08/28/2017 11:40 AM       ROS:  Constitutional: negative for fevers, chills and fatigue  Eyes: negative for visual disturbance  Ears, nose, mouth, throat, and face: + Allergies  Respiratory: negative for cough or dyspnea on exertion  Cardiovascular: negative for chest pain, dyspnea, palpitations, fatigue  Gastrointestinal: negative for nausea, vomiting, change in bowel habits, diarrhea and abdominal pain  Genitourinary:negative for frequency and dysuria  Integument/breast: negative for rash and skin lesion(s)  Hematologic/lymphatic: negative for easy bruising and bleeding  Musculoskeletal: Left shoulder with pain nearly 1 month ago. No injury. Used ibuprofen and symptoms are nearly resolved. Has some mild pain over deltoid with overhead activity.   Initially had night pain but this is resolved as well. Neurological: negative for headaches and dizziness  Behavioral/Psych: negative for anxiety and depression     Objective:     Vitals:    05/14/18 1255   BP: 117/65   Pulse: 84   Resp: 20   Temp: 99 °F (37.2 °C)   TempSrc: Oral   SpO2: 96%   Weight: 212 lb 12.8 oz (96.5 kg)   Height: 5' 6.8\" (1.697 m)     Physical Examination:  General appearance - alert, well appearing, and in no distress  Eyes - sclera anicteric  Ears - nml TMs bilat  Nose - bilat turbinates sig inflamed  Mouth - mucous membranes moist, pharynx normal without lesions  Neck - supple, no significant adenopathy  Lymphatics - no palpable lymphadenopathy, no hepatosplenomegaly  Chest - clear to auscultation, no wheezes, rales or rhonchi, symmetric air entry  Heart - normal rate, regular rhythm, normal S1, S2, no murmurs, rubs, clicks or gallops  Abdomen - soft, nontender, nondistended, no masses or organomegaly  Breasts & Pelvic - exam declined by the patient as done by OB/GYN  Neurological - alert, oriented, normal speech, no focal findings or movement disorder noted  Musculoskeletal -left shoulder with FROM, no TTP, negative Neer's, negative Vargas, negative Apley scratch, negative liftoff, negative impingement. Pain noted with lateral flexion greater than 90°  Extremities - no edema  Skin - normal coloration and turgor, no rashes, no suspicious skin lesions noted  Psych - nml mood and affect  Feet - normal exam with normal sensation including vibration, no skin breakdown    Past Medical History:   Diagnosis Date    Chronic obstructive pulmonary disease (HCC)     possible, put on a rtrial of advair - no PFT's    Diabetes ([facility] [address]     H/O seasonal allergies      History reviewed. No pertinent surgical history.   Current Outpatient Prescriptions on File Prior to Visit   Medication Sig Dispense Refill    TOUJEO SOLOSTAR U-300 INSULIN 300 unit/mL (1.5 mL) inpn INJECT 38 UNITS SUBCUTANEOUSLY DAILY (Patient taking differently: INJECT 45 UNITS SUBCUTANEOUSLY DAILY) 4.5 mL 4    Insulin Needles, Disposable, 32 gauge x 5/32\" ndle USE TWO TIMES A DAY AS DIRECTED 100 Pen Needle 2    Insulin Syringe-Needle U-100 (INSULIN SYRINGE ULTRAFINE) 0.5 mL 29 gauge x 1/2\" syrg Use as instructed 100 Syringe 1    metFORMIN (GLUCOPHAGE) 1,000 mg tablet TAKE ONE TABLET BY MOUTH TWICE A DAY WITH MEALS 60 Tab 5    Lancets misc Test blood sugars twice daily 100 Each 1    glucose blood VI test strips (FREESTYLE INSULINX TEST STRIPS) strip Test blood sugars twice daily  Diagnosis E 11.65 300 Strip 1     No current facility-administered medications on file prior to visit. Assessment/ Plan:   Diagnoses and all orders for this visit:    1. Well adult exam - to have Pap and mammogram done with GYN  -     METABOLIC PANEL, COMPREHENSIVE; Future  -     LIPID PANEL; Future  -     HEMOGLOBIN A1C WITH EAG; Future  -     MICROALBUMIN, UR, RAND W/ MICROALB/CREAT RATIO; Future    2. Type 2 diabetes mellitus without complication, with long-term current use of insulin ([facility])had been well controlled, having more trouble getting fasting glucose down. Continue Toujeo 45 units and metformin. Encouraged alcohol cessation and continue changes to diet and exercise. Would consider switching over to [place]  -     atorvastatin (LIPITOR) 20 mg tablet; Take 1 Tab by mouth daily. -      DIABETES FOOT EXAM  -     METABOLIC PANEL, COMPREHENSIVE; Future  -     LIPID PANEL; Future  -     HEMOGLOBIN A1C WITH EAG; Future  -     MICROALBUMIN, UR, RAND W/ MICROALB/CREAT RATIO; Future    3. Hyperlipidemia LDL goal <100rechecking labs since starting Lipitor at last appointment  -     atorvastatin (LIPITOR) 20 mg tablet; Take 1 Tab by mouth daily.  -     METABOLIC PANEL, COMPREHENSIVE; Future  -     LIPID PANEL; Future  -     HEMOGLOBIN A1C WITH EAG; Future    4. Tobacco abuse disorderencouraged cessation again    5. BMI 33.0-33.9,adult  Discussed the patient's BMI.   The BMI follow up plan is as follows:   dietary management education, guidance, and counseling  encourage exercise  monitor weight  prescribed dietary intake    Also discussed left shoulder pain. Suspect this was more of a rotator cuff tendinitis given short duration alongside of night pain. May have been frozen shoulder that resolved rapidly. I have discussed the diagnosis with the patient and the intended plan as seen in the above orders. The patient has received an after-visit summary and questions were answered concerning future plans. I have discussed medication side effects and warnings with the patient as well. The patient verbalizes understanding and agreement with the plan. Follow-up Disposition:  Return in about 3 months (around 8/14/2018), or if symptoms worsen or fail to improve, for Regular Follow up.

## 2018-08-27 RX ORDER — PEN NEEDLE, DIABETIC 31 GX3/16"
NEEDLE, DISPOSABLE MISCELLANEOUS
Qty: 100 PEN NEEDLE | Refills: 2 | Status: SHIPPED | OUTPATIENT
Start: 2018-08-27 | End: 2018-09-06 | Stop reason: SDUPTHER

## 2018-09-06 RX ORDER — PEN NEEDLE, DIABETIC 31 GX3/16"
NEEDLE, DISPOSABLE MISCELLANEOUS
Qty: 100 PEN NEEDLE | Refills: 2 | Status: SHIPPED | OUTPATIENT
Start: 2018-09-06 | End: 2018-09-24 | Stop reason: SDUPTHER

## 2018-09-24 ENCOUNTER — OFFICE VISIT (OUTPATIENT)
Dept: FAMILY MEDICINE CLINIC | Age: 48
End: 2018-09-24

## 2018-09-24 VITALS
RESPIRATION RATE: 16 BRPM | HEIGHT: 67 IN | HEART RATE: 93 BPM | TEMPERATURE: 98.3 F | WEIGHT: 212.6 LBS | OXYGEN SATURATION: 94 % | DIASTOLIC BLOOD PRESSURE: 73 MMHG | SYSTOLIC BLOOD PRESSURE: 120 MMHG | BODY MASS INDEX: 33.37 KG/M2

## 2018-09-24 DIAGNOSIS — E78.5 HYPERLIPIDEMIA LDL GOAL <100: ICD-10-CM

## 2018-09-24 DIAGNOSIS — Z79.4 TYPE 2 DIABETES MELLITUS WITHOUT COMPLICATION, WITH LONG-TERM CURRENT USE OF INSULIN (HCC): ICD-10-CM

## 2018-09-24 DIAGNOSIS — Z00.00 WELL ADULT EXAM: ICD-10-CM

## 2018-09-24 DIAGNOSIS — Z79.4 TYPE 2 DIABETES MELLITUS WITHOUT COMPLICATION, WITH LONG-TERM CURRENT USE OF INSULIN (HCC): Primary | ICD-10-CM

## 2018-09-24 DIAGNOSIS — E11.9 TYPE 2 DIABETES MELLITUS WITHOUT COMPLICATION, WITH LONG-TERM CURRENT USE OF INSULIN (HCC): Primary | ICD-10-CM

## 2018-09-24 DIAGNOSIS — Z72.0 TOBACCO ABUSE DISORDER: ICD-10-CM

## 2018-09-24 DIAGNOSIS — E11.9 TYPE 2 DIABETES MELLITUS WITHOUT COMPLICATION, WITH LONG-TERM CURRENT USE OF INSULIN (HCC): ICD-10-CM

## 2018-09-24 DIAGNOSIS — F41.9 ANXIETY: ICD-10-CM

## 2018-09-24 RX ORDER — PEN NEEDLE, DIABETIC 31 GX3/16"
NEEDLE, DISPOSABLE MISCELLANEOUS
Qty: 100 PEN NEEDLE | Refills: 2 | Status: SHIPPED | OUTPATIENT
Start: 2018-09-24 | End: 2019-03-06 | Stop reason: SDUPTHER

## 2018-09-24 RX ORDER — ESCITALOPRAM OXALATE 10 MG/1
TABLET ORAL
Qty: 30 TAB | Refills: 1 | Status: SHIPPED | OUTPATIENT
Start: 2018-09-24 | End: 2018-11-14 | Stop reason: SDUPTHER

## 2018-09-24 NOTE — MR AVS SNAPSHOT
[facility] [address] [facility] 
[phone] Patient: Leticia Pham MRN: [id number] VCA:[id number] Visit Information Date & Time Provider Department Dept. Phone Encounter #  
 9/24/2018  2:00 PM Josr Miner MD [facility] at [address] [id number] Follow-up Instructions Return in about 3 months (around 12/24/2018) for Regular Follow up. Upcoming Health Maintenance Date Due  
 PAP AKA CERVICAL CYTOLOGY 7/24/1991 MICROALBUMIN Q1 10/20/2017 LIPID PANEL Q1 8/28/2018 Influenza Age 5 to Adult 5/24/2019* HEMOGLOBIN A1C Q6M 11/14/2018 EYE EXAM RETINAL OR DILATED Q1 5/7/2019 FOOT EXAM Q1 5/14/2019 DTaP/Tdap/Td series (2 - Td) 10/20/2026 *Topic was postponed. The date shown is not the original due date. Allergies as of 9/24/2018  Review Complete On: 9/24/2018 By: Rebecca Andres LPN Severity Noted Reaction Type Reactions Pcn [Penicillins]  09/21/2016    Hives Current Immunizations  Never Reviewed No immunizations on file. Not reviewed this visit You Were Diagnosed With   
  
 Codes Comments Type 2 diabetes mellitus without complication, with long-term current use of insulin (HCC)    -  Primary ICD-10-CM: E11.9, Z79.4 ICD-9-CM: 250.00, V58.67 Hyperlipidemia LDL goal <100     ICD-10-CM: E78.5 ICD-9-CM: 272.4 Tobacco abuse disorder     ICD-10-CM: Z72.0 ICD-9-CM: 305.1 BMI 33.0-33.9,adult     ICD-10-CM: K48.58 
ICD-9-CM: V85.33 Anxiety     ICD-10-CM: F41.9 ICD-9-CM: 300.00 Well adult exam     ICD-10-CM: Z00.00 ICD-9-CM: V70.0 Vitals BP Pulse Temp Resp Height(growth percentile) Weight(growth percentile) 120/73 (BP 1 Location: Left arm, BP Patient Position: Sitting) 93 98.3 °F (36.8 °C) (Oral) 16 5' 6.8\" (1.697 m) 212 lb 9.6 oz (96.4 kg) SpO2 BMI OB Status Smoking Status 94% 33.5 kg/m2 Premenopausal Current Every Day Smoker Vitals History BMI and BSA Data Body Mass Index Body Surface Area  
 33.5 kg/m 2 2.13 m 2 Preferred Pharmacy Pharmacy Name Phone [address] [phone] Your Updated Medication List  
  
   
This list is accurate as of 9/24/18  3:17 PM.  Always use your most recent med list.  
  
  
  
  
 atorvastatin 20 mg tablet Commonly known as:  LIPITOR Take 1 Tab by mouth daily. escitalopram oxalate 10 mg tablet Commonly known as:  Arty Euless Take 1/2 tab by mouth daily x 1 week and then increase to 1 tab daily FREESTYLE FREEDOM LITE FREESTYLE LANCETS  
  
 glucose blood VI test strips strip Commonly known as:  FREESTYLE INSULINX TEST STRIPS Test blood sugars twice daily  Diagnosis E 11.65 Insulin Needles (Disposable) 32 gauge x 5/32\" Ndle USE TWO TIMES A DAY AS DIRECTED  
  
 metFORMIN 1,000 mg tablet Commonly known as:  GLUCOPHAGE  
TAKE ONE TABLET BY MOUTH TWICE A DAY WITH MEALS [facility] SOLOSTAR U-300 INSULIN 300 unit/mL (1.5 mL) Inpn Generic drug:  insulin glargine INJECT 38 UNITS SUBCUTANEOUSLY DAILY Prescriptions Sent to Pharmacy Refills Insulin Needles, Disposable, 32 gauge x 5/32\" ndle 2 Sig: USE TWO TIMES A DAY AS DIRECTED Class: Normal  
 Pharmacy: [address] Ph #: [phone]  
 escitalopram oxalate (LEXAPRO) 10 mg tablet 1 Sig: Take 1/2 tab by mouth daily x 1 week and then increase to 1 tab daily Class: Normal  
 Pharmacy: [address] Ph #: [phone] We Performed the Following MICROALBUMIN, UR, RAND W/ MICROALB/CREAT RATIO [id number] CPT(R)] Follow-up Instructions Return in about 3 months (around 12/24/2018) for Regular Follow up. To-Do List   
 09/24/2018 Lab:  CBC W/O DIFF   
  
 09/24/2018 Lab:  HEMOGLOBIN A1C WITH EAG   
  
 09/24/2018 Lab:  LIPID PANEL   
  
 09/24/2018 Lab:  METABOLIC PANEL, COMPREHENSIVE   
  
 09/24/2018 Lab:  TSH 3RD GENERATION Introducing \[facility]\"" & HEALTH SERVICES! Adama Vu introduces Fonality patient portal. Now you can access parts of your medical record, email your doctor's office, and request medication refills online. 1. In your internet browser, go to [email or web address] 2. Click on the First Time User? Click Here link in the Sign In box. You will see the New Member Sign Up page. 3. Enter your Fonality Access Code exactly as it appears below. You will not need to use this code after youve completed the sign-up process. If you do not sign up before the expiration date, you must request a new code. · Fonality Access Code: [email or web address] Expires: 12/23/2018  3:17 PM 
 
4. Enter the last four digits of your Social Security Number (xxxx) and Date of Birth (mm/dd/yyyy) as indicated and click Submit. You will be taken to the next sign-up page. 5. Create a Fonality ID. This will be your Fonality login ID and cannot be changed, so think of one that is secure and easy to remember. 6. Create a Fonality password. You can change your password at any time. 7. Enter your Password Reset Question and Answer. This can be used at a later time if you forget your password. 8. Enter your e-mail address. You will receive e-mail notification when new information is available in [address] 9. Click Sign Up. You can now view and download portions of your medical record. 10. Click the Download Summary menu link to download a portable copy of your medical information. If you have questions, please visit the Frequently Asked Questions section of the Fonality website. Remember, Fonality is NOT to be used for urgent needs. For medical emergencies, dial 911. Now available from your iPhone and Android! Please provide this summary of care documentation to your next provider. Your primary care clinician is listed as Chrissie Edmondson. If you have any questions after today's visit, please call [phone].

## 2018-09-24 NOTE — PROGRESS NOTES
Subjective: Chief Complaint Patient presents with  Diabetes 3 month follow-up She  is a 50 y.o. female who presents for evaluation of:  
 
Diabetes Mellitus: Doing ok on Toujeo 45 units and Metformin Reports no polyuria or polydipsia, no chest pain, dyspnea or TIA's, no numbness, tingling or pain in extremities, last eye exam approximately > 1 yr ago. Exercises regularly with walking. Compliant with diabetic diet. Avoids sodas and sweet teas. Avoids fast food. Limits carbs. Pt is a smoker but working on quitting - smoking 1ppd Drinks EtOH about 3-6 drinks over weekend. Lab Results Component Value Date/Time Hemoglobin A1c (POC) 7.3 05/14/2018 02:00 PM  
 Hemoglobin A1c (POC) 6.3 05/08/2017 04:20 PM  
 Hemoglobin A1c 6.1 (H) 08/28/2017 11:40 AM  
 Microalb/Creat ratio (ug/mg creat.) <3.8 10/20/2016 02:38 PM  
 LDL, calculated 125 (H) 08/28/2017 11:40 AM  
 Creatinine 0.77 08/28/2017 11:40 AM  
  
Lab Results Component Value Date/Time GFR est  08/28/2017 11:40 AM  
 GFR est non-AA 92 08/28/2017 11:40 AM  
  
Lab Results Component Value Date/Time TSH 3.440 08/28/2017 11:40 AM  
 
ROS Gen - no fever/chills Resp - no dyspnea or cough CV - no chest pain or BAR Rest per HPI Objective:  
 
Vitals:  
 09/24/18 1428 BP: 120/73 Pulse: 93 Resp: 16 Temp: 98.3 °F (36.8 °C) TempSrc: Oral  
SpO2: 94% Weight: 212 lb 9.6 oz (96.4 kg) Height: 5' 6.8\" (1.697 m) Physical Examination: 
General appearance - alert, well appearing, and in no distress Eyes -sclera anicteric Neck - supple, no significant adenopathy, no thyromegaly Chest - clear to auscultation, no wheezes, rales or rhonchi, symmetric air entry Heart - normal rate, regular rhythm, normal S1, S2, no murmurs, rubs, clicks or gallops Neurological - alert, oriented, normal speech, no focal findings or movement disorder noted Extr - no edema Psych - 
 
Past Medical History:  
Diagnosis Date  Chronic obstructive pulmonary disease (HCC)   
 possible, put on a rtrial of advair - no PFT's  Diabetes ([facility] [address]  H/O seasonal allergies History reviewed. No pertinent surgical history. Current Outpatient Prescriptions on File Prior to Visit Medication Sig Dispense Refill  atorvastatin (LIPITOR) 20 mg tablet Take 1 Tab by mouth daily. 90 Tab 1  
 TOUJEO SOLOSTAR U-300 INSULIN 300 unit/mL (1.5 mL) inpn INJECT 38 UNITS SUBCUTANEOUSLY DAILY (Patient taking differently: INJECT 45 UNITS SUBCUTANEOUSLY DAILY) 4.5 mL 4  
 metFORMIN (GLUCOPHAGE) 1,000 mg tablet TAKE ONE TABLET BY MOUTH TWICE A DAY WITH MEALS 60 Tab 5  
 glucose blood VI test strips (FREESTYLE INSULINX TEST STRIPS) strip Test blood sugars twice daily  Diagnosis E 11.65 300 Strip 1 No current facility-administered medications on file prior to visit. Assessment/ Plan:  
Diagnoses and all orders for this visit: 
 
1. Type 2 diabetes mellitus without complication, with long-term current use of insulin (HCC) - encouraged weight loss and ct current regimen until getting labs back -     MICROALBUMIN, UR, RAND W/ MICROALB/CREAT RATIO 
-     LIPID PANEL; Future -     METABOLIC PANEL, COMPREHENSIVE; Future 
-     HEMOGLOBIN A1C WITH EAG; Future 
-     TSH 3RD GENERATION; Future 2. Hyperlipidemia LDL goal <100 - ct statin -     LIPID PANEL; Future -     METABOLIC PANEL, COMPREHENSIVE; Future 
-     HEMOGLOBIN A1C WITH EAG; Future 
-     TSH 3RD GENERATION; Future 3. Tobacco abuse disorder - encouraged cessation 4. BMI 33.0-33.9,adult - encouraged weight loss through diet and exercise -     LIPID PANEL; Future 
-     HEMOGLOBIN A1C WITH EAG; Future 
-     TSH 3RD GENERATION; Future 5. Anxiety - starting Lexapro and will taper up on dose 
-     escitalopram oxalate (LEXAPRO) 10 mg tablet; Take 1/2 tab by mouth daily x 1 week and then increase to 1 tab daily 6. Well adult exam - ordering labs -     CBC W/O DIFF; Future -     LIPID PANEL; Future -     METABOLIC PANEL, COMPREHENSIVE; Future 
-     HEMOGLOBIN A1C WITH EAG; Future 
-     TSH 3RD GENERATION; Future Other orders -     Insulin Needles, Disposable, 32 gauge x 5/32\" ndle; USE TWO TIMES A DAY AS DIRECTED I have discussed the diagnosis with the patient and the intended plan as seen in the above orders. The patient has received an after-visit summary and questions were answered concerning future plans. I have discussed medication side effects and warnings with the patient as well. The patient verbalizes understanding and agreement with the plan. Follow-up Disposition: 
Return in about 3 months (around 12/24/2018) for Regular Follow up.

## 2018-09-24 NOTE — PROGRESS NOTES
Chief Complaint Patient presents with  Diabetes 3 month follow-up 1. Have you been to the ER, urgent care clinic since your last visit? Hospitalized since your last visit? No 
 
2. Have you seen or consulted any other health care providers outside of the [facility] since your last visit? Include any pap smears or colon screening.  No  
Room 4

## 2018-09-25 LAB
ALBUMIN/CREAT UR: 4.6 MG/G CREAT (ref 0–30)
CREAT UR-MCNC: 90.1 MG/DL
MICROALBUMIN UR-MCNC: 4.1 UG/ML

## 2018-10-07 DIAGNOSIS — Z79.4 TYPE 2 DIABETES MELLITUS WITHOUT COMPLICATION, WITH LONG-TERM CURRENT USE OF INSULIN (HCC): ICD-10-CM

## 2018-10-07 DIAGNOSIS — E11.9 TYPE 2 DIABETES MELLITUS WITHOUT COMPLICATION, WITH LONG-TERM CURRENT USE OF INSULIN (HCC): ICD-10-CM

## 2018-10-07 RX ORDER — INSULIN GLARGINE 300 U/ML
INJECTION, SOLUTION SUBCUTANEOUS
Qty: 4.5 ML | Refills: 3 | Status: SHIPPED | OUTPATIENT
Start: 2018-10-07 | End: 2019-02-04 | Stop reason: SDUPTHER

## 2018-10-27 DIAGNOSIS — E11.9 TYPE 2 DIABETES MELLITUS WITHOUT COMPLICATION, WITH LONG-TERM CURRENT USE OF INSULIN (HCC): ICD-10-CM

## 2018-10-27 DIAGNOSIS — Z79.4 TYPE 2 DIABETES MELLITUS WITHOUT COMPLICATION, WITH LONG-TERM CURRENT USE OF INSULIN (HCC): ICD-10-CM

## 2018-10-29 RX ORDER — METFORMIN HYDROCHLORIDE 1000 MG/1
TABLET ORAL
Qty: 60 TAB | Refills: 4 | Status: SHIPPED | OUTPATIENT
Start: 2018-10-29 | End: 2019-03-06 | Stop reason: SDUPTHER

## 2018-11-14 DIAGNOSIS — F41.9 ANXIETY: ICD-10-CM

## 2018-11-14 RX ORDER — ESCITALOPRAM OXALATE 10 MG/1
TABLET ORAL
Qty: 30 TAB | Refills: 0 | Status: SHIPPED | OUTPATIENT
Start: 2018-11-14 | End: 2018-12-19 | Stop reason: SDUPTHER

## 2018-11-20 DIAGNOSIS — Z79.4 TYPE 2 DIABETES MELLITUS WITHOUT COMPLICATION, WITH LONG-TERM CURRENT USE OF INSULIN (HCC): ICD-10-CM

## 2018-11-20 DIAGNOSIS — E11.9 TYPE 2 DIABETES MELLITUS WITHOUT COMPLICATION, WITH LONG-TERM CURRENT USE OF INSULIN (HCC): ICD-10-CM

## 2019-01-15 ENCOUNTER — OFFICE VISIT (OUTPATIENT)
Dept: FAMILY MEDICINE CLINIC | Age: 49
End: 2019-01-15

## 2019-01-15 VITALS
BODY MASS INDEX: 33.97 KG/M2 | SYSTOLIC BLOOD PRESSURE: 126 MMHG | TEMPERATURE: 98.1 F | WEIGHT: 216.4 LBS | HEIGHT: 67 IN | HEART RATE: 83 BPM | RESPIRATION RATE: 16 BRPM | DIASTOLIC BLOOD PRESSURE: 74 MMHG | OXYGEN SATURATION: 94 %

## 2019-01-15 DIAGNOSIS — Z01.419 ENCOUNTER FOR CERVICAL PAP SMEAR WITH PELVIC EXAM: ICD-10-CM

## 2019-01-15 DIAGNOSIS — F41.9 ANXIETY: ICD-10-CM

## 2019-01-15 DIAGNOSIS — Z79.4 TYPE 2 DIABETES MELLITUS WITHOUT COMPLICATION, WITH LONG-TERM CURRENT USE OF INSULIN (HCC): Primary | ICD-10-CM

## 2019-01-15 DIAGNOSIS — E66.09 CLASS 1 OBESITY DUE TO EXCESS CALORIES WITH BODY MASS INDEX (BMI) OF 34.0 TO 34.9 IN ADULT, UNSPECIFIED WHETHER SERIOUS COMORBIDITY PRESENT: ICD-10-CM

## 2019-01-15 DIAGNOSIS — E11.9 TYPE 2 DIABETES MELLITUS WITHOUT COMPLICATION, WITH LONG-TERM CURRENT USE OF INSULIN (HCC): Primary | ICD-10-CM

## 2019-01-15 DIAGNOSIS — Z72.0 TOBACCO ABUSE DISORDER: ICD-10-CM

## 2019-01-15 RX ORDER — ESCITALOPRAM OXALATE 10 MG/1
20 TABLET ORAL DAILY
Qty: 60 TAB | Refills: 5 | Status: SHIPPED | OUTPATIENT
Start: 2019-01-15 | End: 2019-03-06 | Stop reason: SDUPTHER

## 2019-01-15 NOTE — PROGRESS NOTES
Chief Complaint Patient presents with  Diabetes 4 month follow-up 1. Have you been to the ER, urgent care clinic since your last visit? Hospitalized since your last visit? No 
 
2. Have you seen or consulted any other health care providers outside of the [address] [place] [address] since your last visit? Include any pap smears or colon screening. No  
Taking 20 mg of Lexapro

## 2019-01-15 NOTE — PROGRESS NOTES
Subjective: Chief Complaint Patient presents with  Diabetes 4 month follow-up She  is a 50 y.o. female who presents for evaluation of:  
 
Diabetes Mellitus: Doing ok on Toujeo 50 units and Metformin Reports no polyuria or polydipsia, no chest pain, dyspnea or TIA's, no numbness, tingling or pain in extremities, last eye exam approximately > 1 yr ago. Exercises regularly with walking. Compliant with diabetic diet. Avoids sodas and sweet teas. Avoids fast food. Limits carbs. Pt is a smoker but working on quitting - smoking 1ppd Drinks EtOH about 3-6 drinks over weekend. Lab Results Component Value Date/Time Hemoglobin A1c (POC) 7.3 05/14/2018 02:00 PM  
 Hemoglobin A1c (POC) 6.3 05/08/2017 04:20 PM  
 Hemoglobin A1c 6.1 (H) 08/28/2017 11:40 AM  
 Microalb/Creat ratio (ug/mg creat.) 4.6 09/24/2018 03:01 PM  
 LDL, calculated 125 (H) 08/28/2017 11:40 AM  
 Creatinine 0.77 08/28/2017 11:40 AM  
  
Lab Results Component Value Date/Time GFR est  08/28/2017 11:40 AM  
 GFR est non-AA 92 08/28/2017 11:40 AM  
  
Lab Results Component Value Date/Time TSH 3.440 08/28/2017 11:40 AM  
 
ROS Gen - no fever/chills Resp - no dyspnea or cough CV - no chest pain or BAR Psych - anxiety - very stressed with work so increased Lexapro to 20 mg daily and would like to ct this until tax day. Rest per HPI Objective:  
 
Vitals:  
 01/15/19 1034 BP: 126/74 Pulse: 83 Resp: 16 Temp: 98.1 °F (36.7 °C) TempSrc: Oral  
SpO2: 94% Weight: 216 lb 6.4 oz (98.2 kg) Height: 5' 6.8\" (1.697 m) Physical Examination: 
General appearance - alert, well appearing, and in no distress Eyes -sclera anicteric Neck - supple, no significant adenopathy, no thyromegaly Chest - clear to auscultation, no wheezes, rales or rhonchi, symmetric air entry Heart - normal rate, regular rhythm, normal S1, S2, no murmurs, rubs, clicks or gallops Neurological - alert, oriented, normal speech, no focal findings or movement disorder noted Extr - no edema Psych - 
 
Past Medical History:  
Diagnosis Date  Chronic obstructive pulmonary disease (HCC)   
 possible, put on a rtrial of advair - no PFT's  Diabetes ([address]  H/O seasonal allergies History reviewed. No pertinent surgical history. Current Outpatient Medications on File Prior to Visit Medication Sig Dispense Refill  atorvastatin (LIPITOR) 20 mg tablet TAKE ONE TABLET BY MOUTH DAILY 30 Tab 5  
 glucose blood VI test strips (FREESTYLE LITE STRIPS) strip TEST BLOOD SUGAR TWO TIMES A DAY 50 Strip 0  
 metFORMIN (GLUCOPHAGE) 1,000 mg tablet TAKE ONE TABLET BY MOUTH TWICE A DAY WITH MEAL. 60 Tab 4  
 TOUJEO SOLOSTAR U-300 INSULIN 300 unit/mL (1.5 mL) inpn INJECT 38 UNITS UNDER THE SKIN ONCE DAILY (Patient taking differently: INJECT 50 UNITS UNDER THE SKIN ONCE DAILY) 4.5 mL 3  
 blood-glucose meter (FREESTYLE FREEDOM LITE)  FREESTYLE LANCETS     
 Insulin Needles, Disposable, 32 gauge x 5/32\" ndle USE TWO TIMES A DAY AS DIRECTED 100 Pen Needle 2 No current facility-administered medications on file prior to visit. Assessment/ Plan:  
Diagnoses and all orders for this visit: 
 
1. Type 2 diabetes mellitus without complication, with long-term current use of insulin (HCC) - near goal, rechecking labs and may adjust meds 2. Anxiety - ct Lexapro at higher dose as this seems to be helping 
-     escitalopram oxalate (LEXAPRO) 10 mg tablet; Take 2 Tabs by mouth daily. 3. Tobacco abuse disorder - encouraged cessation 4. Class 1 obesity due to excess calories with body mass index (BMI) of 34.0 to 34.9 in adult, unspecified whether serious comorbidity present Discussed the patient's BMI. The BMI follow up plan is as follows:  
dietary management education, guidance, and counseling 
encourage exercise 
monitor weight 
prescribed dietary intake 5. Encounter for cervical Pap smear with pelvic exam 
-     REFERRAL TO OBSTETRICS AND GYNECOLOGY I have discussed the diagnosis with the patient and the intended plan as seen in the above orders. The patient has received an after-visit summary and questions were answered concerning future plans. I have discussed medication side effects and warnings with the patient as well. The patient verbalizes understanding and agreement with the plan. Follow-up Disposition: 
Return in about 3 months (around 4/15/2019), or if symptoms worsen or fail to improve.

## 2019-02-04 DIAGNOSIS — Z79.4 TYPE 2 DIABETES MELLITUS WITHOUT COMPLICATION, WITH LONG-TERM CURRENT USE OF INSULIN (HCC): ICD-10-CM

## 2019-02-04 DIAGNOSIS — E11.9 TYPE 2 DIABETES MELLITUS WITHOUT COMPLICATION, WITH LONG-TERM CURRENT USE OF INSULIN (HCC): ICD-10-CM

## 2019-02-04 RX ORDER — INSULIN GLARGINE 300 U/ML
INJECTION, SOLUTION SUBCUTANEOUS
Qty: 4.5 ML | Refills: 2 | Status: SHIPPED | OUTPATIENT
Start: 2019-02-04 | End: 2019-03-06 | Stop reason: SDUPTHER

## 2019-02-22 DIAGNOSIS — Z79.4 TYPE 2 DIABETES MELLITUS WITHOUT COMPLICATION, WITH LONG-TERM CURRENT USE OF INSULIN (HCC): ICD-10-CM

## 2019-02-22 DIAGNOSIS — E11.9 TYPE 2 DIABETES MELLITUS WITHOUT COMPLICATION, WITH LONG-TERM CURRENT USE OF INSULIN (HCC): ICD-10-CM

## 2019-03-06 DIAGNOSIS — Z79.4 TYPE 2 DIABETES MELLITUS WITHOUT COMPLICATION, WITH LONG-TERM CURRENT USE OF INSULIN (HCC): ICD-10-CM

## 2019-03-06 DIAGNOSIS — F41.9 ANXIETY: ICD-10-CM

## 2019-03-06 DIAGNOSIS — E11.9 TYPE 2 DIABETES MELLITUS WITHOUT COMPLICATION, WITH LONG-TERM CURRENT USE OF INSULIN (HCC): ICD-10-CM

## 2019-03-06 DIAGNOSIS — E78.5 HYPERLIPIDEMIA LDL GOAL <100: ICD-10-CM

## 2019-03-06 RX ORDER — METFORMIN HYDROCHLORIDE 1000 MG/1
TABLET ORAL
Qty: 60 TAB | Refills: 0 | Status: SHIPPED | OUTPATIENT
Start: 2019-03-06 | End: 2019-07-08 | Stop reason: SDUPTHER

## 2019-03-06 RX ORDER — ATORVASTATIN CALCIUM 20 MG/1
TABLET, FILM COATED ORAL
Qty: 30 TAB | Refills: 0 | Status: SHIPPED | OUTPATIENT
Start: 2019-03-06 | End: 2019-08-18 | Stop reason: SDUPTHER

## 2019-03-06 RX ORDER — ESCITALOPRAM OXALATE 10 MG/1
20 TABLET ORAL DAILY
Qty: 60 TAB | Refills: 0 | Status: SHIPPED | OUTPATIENT
Start: 2019-03-06 | End: 2019-05-21 | Stop reason: SDUPTHER

## 2019-03-06 RX ORDER — PEN NEEDLE, DIABETIC 31 GX3/16"
NEEDLE, DISPOSABLE MISCELLANEOUS
Qty: 100 PEN NEEDLE | Refills: 0 | Status: SHIPPED | OUTPATIENT
Start: 2019-03-06 | End: 2020-02-21 | Stop reason: SDUPTHER

## 2019-04-30 ENCOUNTER — OFFICE VISIT (OUTPATIENT)
Dept: FAMILY MEDICINE CLINIC | Age: 49
End: 2019-04-30

## 2019-04-30 VITALS
DIASTOLIC BLOOD PRESSURE: 71 MMHG | HEART RATE: 98 BPM | OXYGEN SATURATION: 96 % | TEMPERATURE: 97.2 F | WEIGHT: 221 LBS | HEIGHT: 67 IN | RESPIRATION RATE: 18 BRPM | SYSTOLIC BLOOD PRESSURE: 114 MMHG | BODY MASS INDEX: 34.69 KG/M2

## 2019-04-30 DIAGNOSIS — J02.9 PHARYNGITIS, UNSPECIFIED ETIOLOGY: ICD-10-CM

## 2019-04-30 DIAGNOSIS — Z72.0 TOBACCO ABUSE DISORDER: ICD-10-CM

## 2019-04-30 DIAGNOSIS — Z79.4 TYPE 2 DIABETES MELLITUS WITH HYPEROSMOLARITY WITHOUT COMA, WITH LONG-TERM CURRENT USE OF INSULIN (HCC): ICD-10-CM

## 2019-04-30 DIAGNOSIS — J04.0 LARYNGITIS: Primary | ICD-10-CM

## 2019-04-30 DIAGNOSIS — E11.00 TYPE 2 DIABETES MELLITUS WITH HYPEROSMOLARITY WITHOUT COMA, WITH LONG-TERM CURRENT USE OF INSULIN (HCC): ICD-10-CM

## 2019-04-30 LAB — HBA1C MFR BLD HPLC: 7.6 %

## 2019-04-30 RX ORDER — PROMETHAZINE HYDROCHLORIDE AND CODEINE PHOSPHATE 6.25; 1 MG/5ML; MG/5ML
1 SOLUTION ORAL
Qty: 60 ML | Refills: 0 | Status: SHIPPED | OUTPATIENT
Start: 2019-04-30 | End: 2019-05-07

## 2019-04-30 RX ORDER — DOXYCYCLINE HYCLATE 100 MG
100 TABLET ORAL 2 TIMES DAILY
Qty: 14 TAB | Refills: 0 | Status: SHIPPED | OUTPATIENT
Start: 2019-04-30 | End: 2019-05-07

## 2019-04-30 NOTE — PROGRESS NOTES
Subjective:     Chief Complaint   Patient presents with    Hoarse     and cough        She  is a 50y.o. year old female who presents for evaluation of URI    Upper Respiratory Infection  Patient complains of symptoms of a URI. Symptoms include cough and hoarseness, HA, sneezing, sinus pressure. Onset of sym limited this ulcer is so ptoms was 1.5 weeks ago, unchanged since that time. She is drinking plenty of fluids. Evaluation to date: none. Treatment to date: benadryl and ibuprofen. Diabetes Mellitus: Doing ok on Toujeo 50 units and Metformin  Reports no polyuria or polydipsia, no chest pain, dyspnea or TIA's, no numbness, tingling or pain in extremities, last eye exam approximately > 1 yr ago. Exercises regularly with walking. Compliant with diabetic diet. Avoids sodas and sweet teas. Avoids fast food. Limits carbs. Pt is a smoker but working on quitting - smoking 1ppd  Drinks EtOH about 3-6 drinks over weekend.     Lab Results   Component Value Date/Time    Hemoglobin A1c (POC) 7.6 04/30/2019 04:15 PM    Hemoglobin A1c (POC) 7.3 05/14/2018 02:00 PM    Hemoglobin A1c 6.1 (H) 08/28/2017 11:40 AM    Microalb/Creat ratio (ug/mg creat.) 4.6 09/24/2018 03:01 PM    LDL, calculated 125 (H) 08/28/2017 11:40 AM    Creatinine 0.77 08/28/2017 11:40 AM      Lab Results   Component Value Date/Time    GFR est  08/28/2017 11:40 AM    GFR est non-AA 92 08/28/2017 11:40 AM      Lab Results   Component Value Date/Time    TSH 3.440 08/28/2017 11:40 AM       ROS:  Constitutional: per HPI  Eyes: negative for visual disturbance  Ears, nose, mouth, throat, and face: per HPI  Respiratory: per HPI  Cardiovascular: negative for chest pain, dyspnea, palpitations  Gastrointestinal: negative for nausea, vomiting, diarrhea and abdominal pain  Integument/breast: negative for rash    Objective:     Vitals:    04/30/19 1555   BP: 114/71   Pulse: 98   Resp: 18   Temp: 97.2 °F (36.2 °C)   TempSrc: Oral   SpO2: 96%   Weight: 221 lb (100.2 kg)   Height: 5' 6.8\" (1.697 m)     Physical Examination:   Gen - NAD  Eyes - sclera anicteric  ENT - turbinates inflamed bilat, no sinus tenderness, post pharynx erythematous with significant swelling and no exudate  Resp - CTAB, no w/r/r  CV - rrr, no m/r/g    Allergies   Allergen Reactions    Pcn [Penicillins] Hives      Social History     Socioeconomic History    Marital status:      Spouse name: Not on file    Number of children: Not on file    Years of education: Not on file    Highest education level: Not on file   Tobacco Use    Smoking status: Current Every Day Smoker     Packs/day: 1.00    Smokeless tobacco: Never Used    Tobacco comment: 20 yr hx   Substance and Sexual Activity    Alcohol use: Yes     Alcohol/week: 3.0 oz     Types: 5 Shots of liquor per week     Comment: weekends    Drug use: Yes     Types: Marijuana     Comment: once a week     Sexual activity: Yes     Partners: Male     Birth control/protection: None      Family History   Problem Relation Age of Onset    Dementia Mother     Alzheimer Mother     Heart Disease Father     Alzheimer Father       History reviewed. No pertinent surgical history. Past Medical History:   Diagnosis Date    Chronic obstructive pulmonary disease ([facility] [address]     possible, put on a rtrial of advair - no PFT's    Diabetes ([facility] [address]     H/O seasonal allergies       Current Outpatient Medications   Medication Sig Dispense Refill    doxycycline (VIBRA-TABS) 100 mg tablet Take 1 Tab by mouth two (2) times a day for 7 days. 14 Tab 0    promethazine-codeine (PHENERGAN WITH CODEINE) 6.25-10 mg/5 mL syrup Take 5 mL by mouth four (4) times daily as needed for Cough (May make drowsy) for up to 7 days. Max Daily Amount: 20 mL. 60 mL 0    metFORMIN (GLUCOPHAGE) 1,000 mg tablet TAKE ONE TABLET BY MOUTH TWICE A DAY WITH MEAL. 60 Tab 0    escitalopram oxalate (LEXAPRO) 10 mg tablet Take 2 Tabs by mouth daily.  60 Tab 0    atorvastatin (LIPITOR) 20 mg tablet TAKE ONE TABLET BY MOUTH DAILY 30 Tab 0    insulin glargine U-300 conc (TOUJEO SOLOSTAR U-300 INSULIN) 300 unit/mL (1.5 mL) inpn INJECT 38 UNITS UNDER THE SKIN ONCE DAILY (Patient taking differently: INJECT 40 UNITS UNDER THE SKIN ONCE DAILY) 4.5 mL 0    Insulin Needles, Disposable, 32 gauge x 5/32\" ndle USE TWO TIMES A DAY AS DIRECTED 100 Pen Needle 0    glucose blood VI test strips (FREESTYLE LITE STRIPS) strip TEST BLOOD SUGAR TWO TIMES A DAY 50 Strip 0    blood-glucose meter (FREESTYLE FREEDOM LITE)       FREESTYLE LANCETS           Assessment/ Plan:   Diagnoses and all orders for this visit:    1. Laryngitis  2. Pharyngitis, unspecified etiology  - given sx and duration, will use Doxy and cough syrup PRN  -     doxycycline (VIBRA-TABS) 100 mg tablet; Take 1 Tab by mouth two (2) times a day for 7 days. -     promethazine-codeine (PHENERGAN WITH CODEINE) 6.25-10 mg/5 mL syrup; Take 5 mL by mouth four (4) times daily as needed for Cough (May make drowsy) for up to 7 days. Max Daily Amount: 20 mL. 3. Type 2 diabetes mellitus with hyperosmolarity without coma, with long-term current use of insulin ([address] - working on improving control but recently poor diet and not exercising.  -     AMB POC HEMOGLOBIN A1C    4. Tobacco abuse disorder - prev able to quit but recently restarted     I have discussed the diagnosis with the patient and the intended plan as seen in the above orders. The patient has received an after-visit summary and questions were answered concerning future plans. I have discussed medication side effects and warnings with the patient as well. The patient verbalizes understanding and agreement with the plan. Follow-up and Dispositions    · Return if symptoms worsen or fail to improve.

## 2019-04-30 NOTE — PROGRESS NOTES
Chief Complaint   Patient presents with    Hoarse     and cough   started 1.5 weeks ago ,returned from [facility] 4/19/19  Yellowish mucus drainage

## 2019-04-30 NOTE — LETTER
NOTIFICATION RETURN TO WORK / SCHOOL 
 
4/30/2019 4:27 PM 
 
Ms. Katarzyna Blevins [facility] [id number] To Whom It May Concern: 
 
Kataryzna Blevins is currently under the care of Charisse Bower. With her current medical illness, I recommend that she only work for 4 hours per day from 4/30/19-5/3/19 so she is best able to return to work with no restrictions on 5/6/19. If there are questions or concerns please have the patient contact our office.  
 
 
 
Sincerely, 
 
 
Adwoa Sprague MD

## 2019-04-30 NOTE — PATIENT INSTRUCTIONS
For your current symptoms, please follow these recommendations:    1) Rest your voice  2) Drink plenty of fluids  3) Please quit smoking    I expect your symptoms to improve over the next 2-3 weeks. Doxycycline Information:  Do not take Calcium-, Magnesium-, Aluminum-containing supplements (including OTC anti-acids) or dairy products within 2 hours prior to or after the time the antibiotic medicine is taken--these compounds will keep the antibiotic from being absorbed. Doxycycline should be taken 2 hours prior to lying down/going to bed to prevent irritation of your esophagus from the medicine. Doxycycline makes you sun-sensitive--avoid sunlight and/or use sunscreen if exposed to the sun while taking the medicine.

## 2019-05-21 DIAGNOSIS — F41.9 ANXIETY: ICD-10-CM

## 2019-05-21 NOTE — TELEPHONE ENCOUNTER
Pt needs a refill for: escitalopram oxalate (LEXAPRO) 10 mg tablet       Wants 2 tabs per day       Pls call local to [address]     (Previously filled while she was out of town so she cant get the refills)       Best number to reach her is [phone]

## 2019-05-22 RX ORDER — ESCITALOPRAM OXALATE 10 MG/1
20 TABLET ORAL DAILY
Qty: 60 TAB | Refills: 1 | Status: SHIPPED | OUTPATIENT
Start: 2019-05-22 | End: 2019-12-03 | Stop reason: SDUPTHER

## 2019-06-18 DIAGNOSIS — E11.9 TYPE 2 DIABETES MELLITUS WITHOUT COMPLICATION, WITH LONG-TERM CURRENT USE OF INSULIN (HCC): ICD-10-CM

## 2019-06-18 DIAGNOSIS — Z79.4 TYPE 2 DIABETES MELLITUS WITHOUT COMPLICATION, WITH LONG-TERM CURRENT USE OF INSULIN (HCC): ICD-10-CM

## 2019-06-18 NOTE — TELEPHONE ENCOUNTER
----- Message from Sharon Orellana sent at 6/18/2019  4:39 PM EDT -----  Regarding: Dr. Yvette Chow: [phone]  Pt received a call stating that her insulin Sepinoza Kalin will no longer be covered through [employer], effective 7/1/19. Pt will need a brand that is on the list for the pt. Pt is also due for a refill and would like that called in for her as the one of the insulin brands listed.   Yan Doyle

## 2019-06-19 RX ORDER — INSULIN GLARGINE 300 U/ML
INJECTION, SOLUTION SUBCUTANEOUS
Qty: 4.5 ADJUSTABLE DOSE PRE-FILLED PEN SYRINGE | Refills: 1 | Status: SHIPPED | OUTPATIENT
Start: 2019-06-19 | End: 2019-06-19

## 2019-06-19 RX ORDER — INSULIN GLARGINE 100 [IU]/ML
INJECTION, SOLUTION SUBCUTANEOUS
Qty: 4 PEN | Refills: 0 | Status: SHIPPED | OUTPATIENT
Start: 2019-06-19 | End: 2019-08-18 | Stop reason: SDUPTHER

## 2019-07-08 DIAGNOSIS — E11.9 TYPE 2 DIABETES MELLITUS WITHOUT COMPLICATION, WITH LONG-TERM CURRENT USE OF INSULIN (HCC): ICD-10-CM

## 2019-07-08 DIAGNOSIS — Z79.4 TYPE 2 DIABETES MELLITUS WITHOUT COMPLICATION, WITH LONG-TERM CURRENT USE OF INSULIN (HCC): ICD-10-CM

## 2019-07-10 RX ORDER — METFORMIN HYDROCHLORIDE 1000 MG/1
TABLET ORAL
Qty: 60 TAB | Refills: 0 | Status: SHIPPED | OUTPATIENT
Start: 2019-07-10 | End: 2019-08-27 | Stop reason: SDUPTHER

## 2019-08-18 DIAGNOSIS — Z79.4 TYPE 2 DIABETES MELLITUS WITHOUT COMPLICATION, WITH LONG-TERM CURRENT USE OF INSULIN (HCC): ICD-10-CM

## 2019-08-18 DIAGNOSIS — E78.5 HYPERLIPIDEMIA LDL GOAL <100: ICD-10-CM

## 2019-08-18 DIAGNOSIS — E11.9 TYPE 2 DIABETES MELLITUS WITHOUT COMPLICATION, WITH LONG-TERM CURRENT USE OF INSULIN (HCC): ICD-10-CM

## 2019-08-18 RX ORDER — INSULIN GLARGINE 100 [IU]/ML
INJECTION, SOLUTION SUBCUTANEOUS
Qty: 15 ADJUSTABLE DOSE PRE-FILLED PEN SYRINGE | Refills: 0 | Status: SHIPPED | OUTPATIENT
Start: 2019-08-18 | End: 2019-08-21

## 2019-08-18 RX ORDER — ATORVASTATIN CALCIUM 20 MG/1
TABLET, FILM COATED ORAL
Qty: 30 TAB | Refills: 0 | Status: SHIPPED | OUTPATIENT
Start: 2019-08-18 | End: 2019-10-10 | Stop reason: SDUPTHER

## 2019-08-20 DIAGNOSIS — E11.9 TYPE 2 DIABETES MELLITUS WITHOUT COMPLICATION, WITH LONG-TERM CURRENT USE OF INSULIN (HCC): Primary | ICD-10-CM

## 2019-08-20 DIAGNOSIS — Z79.4 TYPE 2 DIABETES MELLITUS WITHOUT COMPLICATION, WITH LONG-TERM CURRENT USE OF INSULIN (HCC): Primary | ICD-10-CM

## 2019-08-20 NOTE — TELEPHONE ENCOUNTER
----- Message from Naman Wheeler sent at 8/20/2019  3:47 PM EDT -----  Regarding: Dr. Shilpa Pat Message/Vendor Calls    Caller's first and last name:  Darion Benavidez Prior authorization department     Reason for call:  Change medication    Callback required yes/no and why:  yes    Best contact number(s):  [phone]    Details to clarify the request:  Insurance will not cover \"Basaglar\". Requesting for provider to change medication to \"Lantus\".      Naman Wheeler

## 2019-08-21 ENCOUNTER — TELEPHONE (OUTPATIENT)
Dept: FAMILY MEDICINE CLINIC | Age: 49
End: 2019-08-21

## 2019-08-21 RX ORDER — INSULIN GLARGINE 100 [IU]/ML
48 INJECTION, SOLUTION SUBCUTANEOUS DAILY
Qty: 15 ADJUSTABLE DOSE PRE-FILLED PEN SYRINGE | Refills: 0 | Status: SHIPPED | OUTPATIENT
Start: 2019-08-21 | End: 2019-11-13 | Stop reason: ALTCHOICE

## 2019-08-21 NOTE — TELEPHONE ENCOUNTER
----- Message from Digna Merida sent at 8/21/2019  1:27 PM EDT -----  Regarding: Dr. Mohini Carrion telephone   Contact: Wendy Brothers from [facility] is requesting a call back in regards to Lantus needs substitution for it.  Best contact [phone]

## 2019-08-26 DIAGNOSIS — E11.9 TYPE 2 DIABETES MELLITUS WITHOUT COMPLICATION, WITH LONG-TERM CURRENT USE OF INSULIN (HCC): ICD-10-CM

## 2019-08-26 DIAGNOSIS — Z79.4 TYPE 2 DIABETES MELLITUS WITHOUT COMPLICATION, WITH LONG-TERM CURRENT USE OF INSULIN (HCC): ICD-10-CM

## 2019-08-26 NOTE — TELEPHONE ENCOUNTER
Patient called, requesting a refill for     Metformin  1000 mg    [address]    Patient is aware Dr Ysabel Jara is out of the office this week    Can someone fill for her this afternoon    Patient's phone  [phone]

## 2019-08-27 RX ORDER — METFORMIN HYDROCHLORIDE 1000 MG/1
TABLET ORAL
Qty: 60 TAB | Refills: 0 | Status: SHIPPED | OUTPATIENT
Start: 2019-08-27 | End: 2019-11-01 | Stop reason: SDUPTHER

## 2019-10-10 DIAGNOSIS — E78.5 HYPERLIPIDEMIA LDL GOAL <100: ICD-10-CM

## 2019-10-10 DIAGNOSIS — Z79.4 TYPE 2 DIABETES MELLITUS WITHOUT COMPLICATION, WITH LONG-TERM CURRENT USE OF INSULIN (HCC): ICD-10-CM

## 2019-10-10 DIAGNOSIS — E11.9 TYPE 2 DIABETES MELLITUS WITHOUT COMPLICATION, WITH LONG-TERM CURRENT USE OF INSULIN (HCC): ICD-10-CM

## 2019-10-11 RX ORDER — ATORVASTATIN CALCIUM 20 MG/1
TABLET, FILM COATED ORAL
Qty: 30 TAB | Refills: 0 | Status: SHIPPED | OUTPATIENT
Start: 2019-10-11 | End: 2019-11-13 | Stop reason: SDUPTHER

## 2019-11-01 DIAGNOSIS — E11.9 TYPE 2 DIABETES MELLITUS WITHOUT COMPLICATION, WITH LONG-TERM CURRENT USE OF INSULIN (HCC): ICD-10-CM

## 2019-11-01 DIAGNOSIS — Z79.4 TYPE 2 DIABETES MELLITUS WITHOUT COMPLICATION, WITH LONG-TERM CURRENT USE OF INSULIN (HCC): ICD-10-CM

## 2019-11-01 NOTE — TELEPHONE ENCOUNTER
Pt needs a refill for:   metFORMIN (GLUCOPHAGE) 1,000 mg tablet       [address]     She is out of medication and states the pharmacy has tried for a few days to get it filled       Best number to reach her is [phone]

## 2019-11-03 RX ORDER — METFORMIN HYDROCHLORIDE 1000 MG/1
TABLET ORAL
Qty: 60 TAB | Refills: 0 | Status: SHIPPED | OUTPATIENT
Start: 2019-11-03 | End: 2019-12-14 | Stop reason: SDUPTHER

## 2019-11-13 ENCOUNTER — OFFICE VISIT (OUTPATIENT)
Dept: FAMILY MEDICINE CLINIC | Age: 49
End: 2019-11-13

## 2019-11-13 VITALS
WEIGHT: 218 LBS | OXYGEN SATURATION: 95 % | RESPIRATION RATE: 16 BRPM | DIASTOLIC BLOOD PRESSURE: 72 MMHG | BODY MASS INDEX: 34.21 KG/M2 | HEART RATE: 84 BPM | SYSTOLIC BLOOD PRESSURE: 129 MMHG | TEMPERATURE: 97.9 F | HEIGHT: 67 IN

## 2019-11-13 DIAGNOSIS — Z79.4 TYPE 2 DIABETES MELLITUS WITHOUT COMPLICATION, WITH LONG-TERM CURRENT USE OF INSULIN (HCC): ICD-10-CM

## 2019-11-13 DIAGNOSIS — E11.9 TYPE 2 DIABETES MELLITUS WITHOUT COMPLICATION, WITH LONG-TERM CURRENT USE OF INSULIN (HCC): ICD-10-CM

## 2019-11-13 DIAGNOSIS — E66.09 CLASS 1 OBESITY DUE TO EXCESS CALORIES WITH BODY MASS INDEX (BMI) OF 34.0 TO 34.9 IN ADULT, UNSPECIFIED WHETHER SERIOUS COMORBIDITY PRESENT: ICD-10-CM

## 2019-11-13 DIAGNOSIS — Z00.00 WELL ADULT EXAM: Primary | ICD-10-CM

## 2019-11-13 DIAGNOSIS — E78.5 HYPERLIPIDEMIA LDL GOAL <100: ICD-10-CM

## 2019-11-13 DIAGNOSIS — F43.21 GRIEF: ICD-10-CM

## 2019-11-13 RX ORDER — INSULIN DEGLUDEC 200 U/ML
40 INJECTION, SOLUTION SUBCUTANEOUS DAILY
Qty: 6 ML | Refills: 5 | Status: SHIPPED | OUTPATIENT
Start: 2019-11-13 | End: 2020-03-15 | Stop reason: SDUPTHER

## 2019-11-13 RX ORDER — ATORVASTATIN CALCIUM 20 MG/1
TABLET, FILM COATED ORAL
Qty: 90 TAB | Refills: 1 | Status: SHIPPED | OUTPATIENT
Start: 2019-11-13 | End: 2019-12-04 | Stop reason: SDUPTHER

## 2019-11-13 RX ORDER — INSULIN GLARGINE 100 [IU]/ML
54 INJECTION, SOLUTION SUBCUTANEOUS DAILY
Qty: 15 ADJUSTABLE DOSE PRE-FILLED PEN SYRINGE | Refills: 1 | Status: CANCELLED | OUTPATIENT
Start: 2019-11-13

## 2019-11-13 NOTE — PROGRESS NOTES
Subjective:     Chief Complaint   Patient presents with    Complete Physical     Annual        She  is a 52 y.o. female who presents for evaluation of: CPE  Doing well today. Diabetes Mellitus: Doing ok on Toujeo 45 units and Metformin  Reports no polyuria or polydipsia, no chest pain, dyspnea or TIA's, no numbness, tingling or pain in extremities, last eye exam approximately > 1 yr ago. Exercises regularly with walking. Compliant with diabetic diet. Avoids sodas and sweet teas. Avoids fast food. Limits carbs. Pt is a smoker but working on quitting - smoking 1ppd now. Drinks EtOH about 3-6 drinks over weekend.       Lab Results   Component Value Date/Time    Hemoglobin A1c (POC) 7.6 04/30/2019 04:15 PM    Hemoglobin A1c (POC) 7.3 05/14/2018 02:00 PM    Hemoglobin A1c 6.1 (H) 08/28/2017 11:40 AM    Microalb/Creat ratio (ug/mg creat.) 4.6 09/24/2018 03:01 PM    LDL, calculated 125 (H) 08/28/2017 11:40 AM    Creatinine 0.77 08/28/2017 11:40 AM      Lab Results   Component Value Date/Time    GFR est  08/28/2017 11:40 AM    GFR est non-AA 92 08/28/2017 11:40 AM      Lab Results   Component Value Date/Time    TSH 3.440 08/28/2017 11:40 AM       ROS:  Constitutional: negative for fevers, chills and fatigue  Eyes: negative for visual disturbance  Ears, nose, mouth, throat, and face: + Allergies  Respiratory: negative for cough or dyspnea on exertion  Cardiovascular: negative for chest pain, dyspnea, palpitations, fatigue  Gastrointestinal: negative for nausea, vomiting, change in bowel habits, diarrhea and abdominal pain  Genitourinary:negative for frequency and dysuria  Integument/breast: negative for rash and skin lesion(s)  Hematologic/lymphatic: negative for easy bruising and bleeding  Musculoskeletal: No muscle aches or joint pains  Neurological: negative for headaches and dizziness  Behavioral/Psych: negative for anxiety and depression     Objective:     Vitals:    11/13/19 1504   BP: 129/72   Pulse: 84   Resp: 16   Temp: 97.9 °F (36.6 °C)   TempSrc: Oral   SpO2: 95%   Weight: 218 lb (98.9 kg)   Height: 5' 6.8\" (1.697 m)     Physical Examination:  General appearance - alert, well appearing, and in no distress  Eyes - sclera anicteric  Ears - nml TMs bilat  Nose - nml turbinates  Mouth - mucous membranes moist, pharynx normal without lesions  Neck - supple, no significant adenopathy  Lymphatics - no palpable lymphadenopathy, no hepatosplenomegaly  Chest - clear to auscultation, no wheezes, rales or rhonchi, symmetric air entry  Heart - normal rate, regular rhythm, normal S1, S2, no murmurs, rubs, clicks or gallops  Abdomen - soft, nontender, nondistended, no masses or organomegaly  Breasts & Pelvic - exam declined by the patient as done by OB/GYN  Neurological - alert, oriented, normal speech, no focal findings or movement disorder noted  Extremities - no edema  Skin - normal coloration and turgor, no rashes, no suspicious skin lesions noted  Psych - nml mood and affect    Past Medical History:   Diagnosis Date    Chronic obstructive pulmonary disease (HCC)     possible, put on a rtrial of advair - no PFT's    Diabetes ([facility]     H/O seasonal allergies      History reviewed. No pertinent surgical history. Current Outpatient Medications on File Prior to Visit   Medication Sig Dispense Refill    metFORMIN (GLUCOPHAGE) 1,000 mg tablet TAKE ONE TABLET BY MOUTH TWICE A DAY WITH MEAL. 60 Tab 0    escitalopram oxalate (LEXAPRO) 10 mg tablet Take 2 Tabs by mouth daily.  60 Tab 1    Insulin Needles, Disposable, 32 gauge x 5/32\" ndle USE TWO TIMES A DAY AS DIRECTED 100 Pen Needle 0    glucose blood VI test strips (FREESTYLE LITE STRIPS) strip TEST BLOOD SUGAR TWO TIMES A DAY 50 Strip 0    blood-glucose meter (FREESTYLE FREEDOM LITE)       FREESTYLE LANCETS       [DISCONTINUED] atorvastatin (LIPITOR) 20 mg tablet TAKE ONE TABLET BY MOUTH DAILY 30 Tab 0    [DISCONTINUED] insulin glargine (LANTUS,BASAGLAR) 100 unit/mL (3 mL) inpn 48 Units by SubCUTAneous route daily. (Patient taking differently: 54 Units by SubCUTAneous route daily. ) 15 Adjustable Dose Pre-filled Pen Syringe 0     No current facility-administered medications on file prior to visit. Assessment/ Plan:   Diagnoses and all orders for this visit:    1. Well adult exam-doing well overall  -     HEMOGLOBIN A1C WITH EAG  -     LIPID PANEL  -     METABOLIC PANEL, COMPREHENSIVE  -     TSH 3RD GENERATION  -     CBC W/O DIFF  -     URINALYSIS W/ RFLX MICROSCOPIC  -     MICROALBUMIN, UR, RAND W/ MICROALB/CREAT RATIO    2. Hyperlipidemia LDL goal <100-has been at goal, rechecking labs, continue Lipitor  -     atorvastatin (LIPITOR) 20 mg tablet; TAKE ONE TABLET BY MOUTH DAILY  -     HEMOGLOBIN A1C WITH EAG  -     LIPID PANEL  -     METABOLIC PANEL, COMPREHENSIVE  -     TSH 3RD GENERATION    3. Type 2 diabetes mellitus without complication, with long-term current use of insulin (HCC)-stable but recent increased stressors in change in diet, rechecking labs  -     atorvastatin (LIPITOR) 20 mg tablet; TAKE ONE TABLET BY MOUTH DAILY  -     HEMOGLOBIN A1C WITH EAG  -     LIPID PANEL  -     METABOLIC PANEL, COMPREHENSIVE  -     TSH 3RD GENERATION  -     MICROALBUMIN, UR, RAND W/ MICROALB/CREAT RATIO  -     insulin degludec (TRESIBA FLEXTOUCH U-200) 200 unit/mL (3 mL) inpn; 40 Units by SubCUTAneous route daily. -     dulaglutide (TRULICITY) 8.46 DG/8.9 mL sub-q pen; 0.5 mL by SubCUTAneous route every seven (7) days. 4. Class 1 obesity due to excess calories with body mass index (BMI) of 34.0 to 34.9 in adult, unspecified whether serious comorbidity present  Discussed the patient's BMI.   The BMI follow up plan is as follows:   dietary management education, guidance, and counseling  encourage exercise  monitor weight  prescribed dietary intake  -     HEMOGLOBIN A1C WITH EAG  -     LIPID PANEL  -     TSH 3RD GENERATION    5. Grief-doing well with appropriate grieving    I have discussed the diagnosis with the patient and the intended plan as seen in the above orders. The patient has received an after-visit summary and questions were answered concerning future plans. I have discussed medication side effects and warnings with the patient as well. The patient verbalizes understanding and agreement with the plan. Follow-up and Dispositions    · Return in about 3 months (around 2/13/2020), or if symptoms worsen or fail to improve.

## 2019-11-13 NOTE — PROGRESS NOTES
Chief Complaint   Patient presents with    Complete Physical     Annual   1. Have you been to the ER, urgent care clinic since your last visit? Hospitalized since your last visit? No    2. Have you seen or consulted any other health care providers outside of the [address] [place] [address] since your last visit? Include any pap smears or colon screening.  No   Dad passed 8/28/19 had CAD

## 2019-11-15 LAB
ALBUMIN SERPL-MCNC: 5 G/DL (ref 3.5–5.5)
ALBUMIN/CREAT UR: 10.5 MG/G CREAT (ref 0–30)
ALBUMIN/GLOB SERPL: 2.3 {RATIO} (ref 1.2–2.2)
ALP SERPL-CCNC: 73 IU/L (ref 39–117)
ALT SERPL-CCNC: 29 IU/L (ref 0–32)
APPEARANCE UR: CLEAR
AST SERPL-CCNC: 32 IU/L (ref 0–40)
BACTERIA #/AREA URNS HPF: ABNORMAL /[HPF]
BILIRUB SERPL-MCNC: 1.3 MG/DL (ref 0–1.2)
BILIRUB UR QL STRIP: NEGATIVE
BUN SERPL-MCNC: 9 MG/DL (ref 6–24)
BUN/CREAT SERPL: 12 (ref 9–23)
CALCIUM SERPL-MCNC: 10.1 MG/DL (ref 8.7–10.2)
CASTS URNS QL MICRO: ABNORMAL /LPF
CHLORIDE SERPL-SCNC: 98 MMOL/L (ref 96–106)
CHOLEST SERPL-MCNC: 174 MG/DL (ref 100–199)
CO2 SERPL-SCNC: 26 MMOL/L (ref 20–29)
COLOR UR: YELLOW
CREAT SERPL-MCNC: 0.78 MG/DL (ref 0.57–1)
CREAT UR-MCNC: 76.2 MG/DL
EPI CELLS #/AREA URNS HPF: >10 /HPF (ref 0–10)
ERYTHROCYTE [DISTWIDTH] IN BLOOD BY AUTOMATED COUNT: 13.5 % (ref 12.3–15.4)
EST. AVERAGE GLUCOSE BLD GHB EST-MCNC: 192 MG/DL
GLOBULIN SER CALC-MCNC: 2.2 G/DL (ref 1.5–4.5)
GLUCOSE SERPL-MCNC: 152 MG/DL (ref 65–99)
GLUCOSE UR QL: NEGATIVE
HBA1C MFR BLD: 8.3 % (ref 4.8–5.6)
HCT VFR BLD AUTO: 46.4 % (ref 34–46.6)
HDLC SERPL-MCNC: 31 MG/DL
HGB BLD-MCNC: 15.4 G/DL (ref 11.1–15.9)
HGB UR QL STRIP: NEGATIVE
KETONES UR QL STRIP: NEGATIVE
LDLC SERPL CALC-MCNC: 72 MG/DL (ref 0–99)
LEUKOCYTE ESTERASE UR QL STRIP: ABNORMAL
MCH RBC QN AUTO: 32.7 PG (ref 26.6–33)
MCHC RBC AUTO-ENTMCNC: 33.2 G/DL (ref 31.5–35.7)
MCV RBC AUTO: 99 FL (ref 79–97)
MICRO URNS: ABNORMAL
MICROALBUMIN UR-MCNC: 8 UG/ML
MUCOUS THREADS URNS QL MICRO: PRESENT
NITRITE UR QL STRIP: NEGATIVE
PH UR STRIP: 5.5 [PH] (ref 5–7.5)
PLATELET # BLD AUTO: 262 X10E3/UL (ref 150–450)
POTASSIUM SERPL-SCNC: 4.4 MMOL/L (ref 3.5–5.2)
PROT SERPL-MCNC: 7.2 G/DL (ref 6–8.5)
PROT UR QL STRIP: NEGATIVE
RBC # BLD AUTO: 4.71 X10E6/UL (ref 3.77–5.28)
RBC #/AREA URNS HPF: ABNORMAL /HPF (ref 0–2)
SODIUM SERPL-SCNC: 141 MMOL/L (ref 134–144)
SP GR UR: 1.01 (ref 1–1.03)
TRIGL SERPL-MCNC: 356 MG/DL (ref 0–149)
TSH SERPL DL<=0.005 MIU/L-ACNC: 1.39 UIU/ML (ref 0.45–4.5)
UROBILINOGEN UR STRIP-MCNC: 0.2 MG/DL (ref 0.2–1)
VLDLC SERPL CALC-MCNC: 71 MG/DL (ref 5–40)
WBC # BLD AUTO: 8.8 X10E3/UL (ref 3.4–10.8)
WBC #/AREA URNS HPF: ABNORMAL /HPF (ref 0–5)

## 2019-12-03 DIAGNOSIS — F41.9 ANXIETY: ICD-10-CM

## 2019-12-04 DIAGNOSIS — Z79.4 TYPE 2 DIABETES MELLITUS WITHOUT COMPLICATION, WITH LONG-TERM CURRENT USE OF INSULIN (HCC): ICD-10-CM

## 2019-12-04 DIAGNOSIS — E11.9 TYPE 2 DIABETES MELLITUS WITHOUT COMPLICATION, WITH LONG-TERM CURRENT USE OF INSULIN (HCC): ICD-10-CM

## 2019-12-04 DIAGNOSIS — E78.5 HYPERLIPIDEMIA LDL GOAL <100: ICD-10-CM

## 2019-12-06 DIAGNOSIS — E11.9 TYPE 2 DIABETES MELLITUS WITHOUT COMPLICATION, WITH LONG-TERM CURRENT USE OF INSULIN (HCC): Primary | ICD-10-CM

## 2019-12-06 DIAGNOSIS — Z79.4 TYPE 2 DIABETES MELLITUS WITHOUT COMPLICATION, WITH LONG-TERM CURRENT USE OF INSULIN (HCC): Primary | ICD-10-CM

## 2019-12-06 NOTE — TELEPHONE ENCOUNTER
----- Message from Ander Hilliard sent at 12/6/2019 12:50 PM EST -----  Regarding: Dr. George Senior: [phone]  LNYXGE (if not patient): n/a  Relationship of caller (if not patient):n/a  Best contact number(s): [phone]  Name of medication and dosage if known: \"Tresiba\"- Dosage unknown  Is patient out of this medication (yes/no):  Pharmacy name: [facility] Matos at [facility] listed in chart? (yes/no): Yes  Pharmacy phone number: [phone]  Date of last visit: Nov 19, 2019 with Dr. Ida Morales  Details to clarify the request: Pt was told by pharmacy that she needs a PA from her doctor in order to use a coupon to reduce the price of her medication. Pt would like a callback.

## 2019-12-10 RX ORDER — ESCITALOPRAM OXALATE 10 MG/1
20 TABLET ORAL DAILY
Qty: 60 TAB | Refills: 1 | Status: SHIPPED | OUTPATIENT
Start: 2019-12-10 | End: 2020-01-06 | Stop reason: SDUPTHER

## 2019-12-10 RX ORDER — ATORVASTATIN CALCIUM 20 MG/1
TABLET, FILM COATED ORAL
Qty: 90 TAB | Refills: 1 | Status: SHIPPED | OUTPATIENT
Start: 2019-12-10 | End: 2020-01-21 | Stop reason: SDUPTHER

## 2019-12-17 RX ORDER — INSULIN GLARGINE 100 [IU]/ML
INJECTION, SOLUTION SUBCUTANEOUS
Qty: 4 ADJUSTABLE DOSE PRE-FILLED PEN SYRINGE | Refills: 1 | Status: SHIPPED | OUTPATIENT
Start: 2019-12-17 | End: 2020-01-28 | Stop reason: SDUPTHER

## 2019-12-17 NOTE — TELEPHONE ENCOUNTER
Spoke with patient and she has tried several options but due to cost would like to go back to [place] at this time.

## 2020-01-06 DIAGNOSIS — F41.9 ANXIETY: ICD-10-CM

## 2020-01-07 RX ORDER — ESCITALOPRAM OXALATE 10 MG/1
20 TABLET ORAL DAILY
Qty: 60 TAB | Refills: 1 | Status: SHIPPED | OUTPATIENT
Start: 2020-01-07 | End: 2020-03-11 | Stop reason: SDUPTHER

## 2020-01-21 DIAGNOSIS — Z79.4 TYPE 2 DIABETES MELLITUS WITHOUT COMPLICATION, WITH LONG-TERM CURRENT USE OF INSULIN (HCC): ICD-10-CM

## 2020-01-21 DIAGNOSIS — E11.9 TYPE 2 DIABETES MELLITUS WITHOUT COMPLICATION, WITH LONG-TERM CURRENT USE OF INSULIN (HCC): ICD-10-CM

## 2020-01-21 DIAGNOSIS — E78.5 HYPERLIPIDEMIA LDL GOAL <100: ICD-10-CM

## 2020-01-22 RX ORDER — ATORVASTATIN CALCIUM 20 MG/1
TABLET, FILM COATED ORAL
Qty: 30 TAB | Refills: 0 | Status: SHIPPED | OUTPATIENT
Start: 2020-01-22 | End: 2020-03-11 | Stop reason: SDUPTHER

## 2020-01-28 DIAGNOSIS — E11.9 TYPE 2 DIABETES MELLITUS WITHOUT COMPLICATION, WITH LONG-TERM CURRENT USE OF INSULIN (HCC): ICD-10-CM

## 2020-01-28 DIAGNOSIS — Z79.4 TYPE 2 DIABETES MELLITUS WITHOUT COMPLICATION, WITH LONG-TERM CURRENT USE OF INSULIN (HCC): ICD-10-CM

## 2020-02-01 RX ORDER — INSULIN GLARGINE 100 [IU]/ML
INJECTION, SOLUTION SUBCUTANEOUS
Qty: 4 ADJUSTABLE DOSE PRE-FILLED PEN SYRINGE | Refills: 0 | Status: SHIPPED | OUTPATIENT
Start: 2020-02-01 | End: 2020-03-11 | Stop reason: SDUPTHER

## 2020-02-21 RX ORDER — PEN NEEDLE, DIABETIC 31 GX3/16"
NEEDLE, DISPOSABLE MISCELLANEOUS
Qty: 50 PEN NEEDLE | Refills: 0 | Status: SHIPPED | OUTPATIENT
Start: 2020-02-21 | End: 2021-04-16 | Stop reason: SDUPTHER

## 2020-03-11 DIAGNOSIS — Z79.4 TYPE 2 DIABETES MELLITUS WITHOUT COMPLICATION, WITH LONG-TERM CURRENT USE OF INSULIN (HCC): ICD-10-CM

## 2020-03-11 DIAGNOSIS — E78.5 HYPERLIPIDEMIA LDL GOAL <100: ICD-10-CM

## 2020-03-11 DIAGNOSIS — F41.9 ANXIETY: ICD-10-CM

## 2020-03-11 DIAGNOSIS — E11.9 TYPE 2 DIABETES MELLITUS WITHOUT COMPLICATION, WITH LONG-TERM CURRENT USE OF INSULIN (HCC): ICD-10-CM

## 2020-03-13 RX ORDER — ESCITALOPRAM OXALATE 10 MG/1
20 TABLET ORAL DAILY
Qty: 60 TAB | Refills: 0 | Status: SHIPPED | OUTPATIENT
Start: 2020-03-13 | End: 2020-03-15 | Stop reason: SDUPTHER

## 2020-03-13 RX ORDER — INSULIN GLARGINE 100 [IU]/ML
INJECTION, SOLUTION SUBCUTANEOUS
Qty: 4 ADJUSTABLE DOSE PRE-FILLED PEN SYRINGE | Refills: 0 | Status: SHIPPED | OUTPATIENT
Start: 2020-03-13 | End: 2020-03-15 | Stop reason: SDUPTHER

## 2020-03-13 RX ORDER — ATORVASTATIN CALCIUM 20 MG/1
TABLET, FILM COATED ORAL
Qty: 30 TAB | Refills: 0 | Status: SHIPPED | OUTPATIENT
Start: 2020-03-13 | End: 2020-03-15 | Stop reason: SDUPTHER

## 2020-04-29 ENCOUNTER — VIRTUAL VISIT (OUTPATIENT)
Dept: FAMILY MEDICINE CLINIC | Age: 50
End: 2020-04-29

## 2020-04-29 VITALS — TEMPERATURE: 98.6 F

## 2020-04-29 DIAGNOSIS — Z79.4 TYPE 2 DIABETES MELLITUS WITHOUT COMPLICATION, WITH LONG-TERM CURRENT USE OF INSULIN (HCC): Primary | ICD-10-CM

## 2020-04-29 DIAGNOSIS — E11.9 TYPE 2 DIABETES MELLITUS WITHOUT COMPLICATION, WITH LONG-TERM CURRENT USE OF INSULIN (HCC): Primary | ICD-10-CM

## 2020-04-29 DIAGNOSIS — E78.5 HYPERLIPIDEMIA LDL GOAL <100: ICD-10-CM

## 2020-04-29 DIAGNOSIS — E66.09 CLASS 1 OBESITY DUE TO EXCESS CALORIES WITH BODY MASS INDEX (BMI) OF 34.0 TO 34.9 IN ADULT, UNSPECIFIED WHETHER SERIOUS COMORBIDITY PRESENT: ICD-10-CM

## 2020-04-29 DIAGNOSIS — F41.9 ANXIETY: ICD-10-CM

## 2020-04-29 NOTE — PROGRESS NOTES
Priti Rodrigues is a 52 y.o. female who was seen by synchronous (real-time) audio-video technology on 4/29/2020. Consent: Priti Rodrigues, who was seen by synchronous (real-time) audio-video technology, and/or her healthcare decision maker, is aware that this patient-initiated, Telehealth encounter on 4/29/2020 is a billable service, with coverage as determined by her insurance carrier. She is aware that she may receive a bill and has provided verbal consent to proceed: Yes. Assessment & Plan:   Diagnoses and all orders for this visit:    1. Type 2 diabetes mellitus without complication, with long-term current use of insulin (HCC) - sugars seem better, getting new labs. Encouraged diet and exercise daily.  -     HEMOGLOBIN A1C WITH EAG; Future  -     METABOLIC PANEL, BASIC; Future    2. Hyperlipidemia LDL goal <100 - stable  -     HEMOGLOBIN A1C WITH EAG; Future    3. Class 1 obesity due to excess calories with body mass index (BMI) of 34.0 to 34.9 in adult, unspecified whether serious comorbidity present - encouraged ct work on this with diet changes and incr exercise as able  -     HEMOGLOBIN A1C WITH EAG; Future    4. Anxiety - stable      Follow-up and Dispositions    · Return in about 3 months (around 7/29/2020), or if symptoms worsen or fail to improve. Subjective:   Priti Rodrigues is a 52 y.o. female who was seen for Diabetes (6 month follow-up)    Doing well with no concerns today. Diabetes Mellitus: Doing ok on Toujeo 52 units and Metformin  Reports no polyuria or polydipsia, no chest pain, dyspnea or TIA's, no numbness, tingling or pain in extremities, last eye exam approximately > 1 yr ago. Exercises regularly with walking. Compliant with diabetic diet. Avoids sodas and sweet teas. Avoids fast food. Limits carbs. Pt is a smoker but working on quitting - still smoking 1ppd  Drinks EtOH about 3-6 drinks over weekend.     Lab Results   Component Value Date/Time Hemoglobin A1c (POC) 7.6 04/30/2019 04:15 PM    Hemoglobin A1c (POC) 7.3 05/14/2018 02:00 PM    Hemoglobin A1c 8.3 (H) 11/13/2019 04:52 PM    Microalb/Creat ratio (ug/mg creat.) 10.5 11/13/2019 04:52 PM    LDL, calculated 72 11/13/2019 04:52 PM    Creatinine 0.78 11/13/2019 04:52 PM      Lab Results   Component Value Date/Time    GFR est  11/13/2019 04:52 PM    GFR est non-AA 90 11/13/2019 04:52 PM      Lab Results   Component Value Date/Time    TSH 1.390 11/13/2019 04:52 PM           Prior to Admission medications    Medication Sig Start Date End Date Taking? Authorizing Provider   escitalopram oxalate (LEXAPRO) 10 mg tablet Take 2 Tabs by mouth daily. 3/15/20  Yes Denise Dove MD   atorvastatin (LIPITOR) 20 mg tablet TAKE ONE TABLET BY MOUTH DAILY. Please call and schedule follow-up appointment with Dr Lauryn Patricia prior to next refill 3/15/20  Yes Denise Dove MD   insulin glargine (LANTUS,BASAGLAR) 100 unit/mL (3 mL) inpn 52 Units by SubCUTAneous route daily for 90 days. Please call office and schedule follow-up appointment with Dr Lauryn Patricia 3/15/20 6/13/20 Yes Denise Dove MD   Insulin Needles, Disposable, 32 gauge x 5/32\" ndle USE TWO TIMES A DAY AS DIRECTED. Please call office and schedule appointment with Dr Lauryn Patricia 2/21/20  Yes Denise Dove MD   metFORMIN (GLUCOPHAGE) 1,000 mg tablet TAKE ONE TABLET BY MOUTH TWICE A DAY WITH MEAL. 12/18/19  Yes Denise Dove MD   glucose blood VI test strips (FREESTYLE LITE STRIPS) strip TEST BLOOD SUGAR TWO TIMES A DAY 2/22/19  Yes Denise Dove MD   blood-glucose meter (FREESTYLE FREEDOM LITE)  9/21/18  Yes Provider, Historical   FREESTYLE LANCETS  8/27/18  Yes Provider, Historical   dulaglutide (TRULICITY) 8.99 UE/8.3 mL sub-q pen 0.5 mL by SubCUTAneous route every seven (7) days.  11/13/19 4/29/20  Denise Dove MD     Allergies   Allergen Reactions   Lisa Mauricio [Penicillins] Hives       Patient Active Problem List    Diagnosis Date Noted    Type II diabetes mellitus ([facility] 09/26/2016     Priority: 2 - Two     Class: Present on Admission    CAP (community acquired pneumonia) 09/26/2016     Priority: 2 - Two     Class: Present on Admission    Tobacco abuse disorder 09/26/2016     Priority: 3 - Three     Class: Present on Admission    Obesity 09/26/2016     Class: Present on Admission     Past Medical History:   Diagnosis Date    [facility]     possible, put on a rtrial of advair - no PFT's    Diabetes ([facility]     H/O seasonal allergies        ROS  Gen - no fever/chills  Resp - no dyspnea or cough  CV - no chest pain or BAR  Psych - anxiety doing ok during pandemic, ct on Lexapro  Rest per HPI    Objective:   Vital Signs: (As obtained by patient/caregiver at home)  Visit Vitals  Temp 98.6 °F (37 °C) (Oral)        Physical exam:  General appearance - alert, well appearing, and in no distress  Eyes -sclera anicteric, no discharge  HEENT normocephalic, atraumatic, moist mucous membranes, no visualized neck mass  Chest -normal respiratory effort, no visualized signs of respiratory distress  Neurological - alert, awake, normal speech, no focal findings or movement disorder noted  Psych - normal mood and affect  Skin no apparent lesions    We discussed the expected course, resolution and complications of the diagnosis(es) in detail. Medication risks, benefits, costs, interactions, and alternatives were discussed as indicated. I advised her to contact the office if her condition worsens, changes or fails to improve as anticipated. She expressed understanding with the diagnosis(es) and plan. Walker Gibson is a 52 y.o. female who was evaluated by a video visit encounter for concerns as above. Patient identification was verified prior to start of the visit. A caregiver was present when appropriate.  Due to this being a TeleHealth encounter (During [facility] public health emergency), evaluation of the following organ systems was limited: Vitals/Constitutional/EENT/Resp/CV/GI//MS/Neuro/Skin/Heme-Lymph-Imm. Pursuant to the emergency declaration under the [facility] waiver authority and the [employer] Act, this Virtual  Visit was conducted, with patient's (and/or legal guardian's) consent, to reduce the patient's risk of exposure to COVID-19 and provide necessary medical care. Services were provided through a video synchronous discussion virtually to substitute for in-person clinic visit. Patient and provider were located at their individual homes.       Maryanne Nick MD

## 2020-05-29 DIAGNOSIS — E78.5 HYPERLIPIDEMIA LDL GOAL <100: ICD-10-CM

## 2020-05-29 DIAGNOSIS — Z79.4 TYPE 2 DIABETES MELLITUS WITHOUT COMPLICATION, WITH LONG-TERM CURRENT USE OF INSULIN (HCC): ICD-10-CM

## 2020-05-29 DIAGNOSIS — E66.09 CLASS 1 OBESITY DUE TO EXCESS CALORIES WITH BODY MASS INDEX (BMI) OF 34.0 TO 34.9 IN ADULT, UNSPECIFIED WHETHER SERIOUS COMORBIDITY PRESENT: ICD-10-CM

## 2020-05-29 DIAGNOSIS — E11.9 TYPE 2 DIABETES MELLITUS WITHOUT COMPLICATION, WITH LONG-TERM CURRENT USE OF INSULIN (HCC): ICD-10-CM

## 2020-07-27 ENCOUNTER — VIRTUAL VISIT (OUTPATIENT)
Dept: FAMILY MEDICINE CLINIC | Age: 50
End: 2020-07-27

## 2020-07-27 DIAGNOSIS — Z12.11 COLON CANCER SCREENING: ICD-10-CM

## 2020-07-27 DIAGNOSIS — E66.09 CLASS 1 OBESITY DUE TO EXCESS CALORIES WITH BODY MASS INDEX (BMI) OF 34.0 TO 34.9 IN ADULT, UNSPECIFIED WHETHER SERIOUS COMORBIDITY PRESENT: ICD-10-CM

## 2020-07-27 DIAGNOSIS — E78.5 HYPERLIPIDEMIA LDL GOAL <100: ICD-10-CM

## 2020-07-27 DIAGNOSIS — E78.1 HYPERTRIGLYCERIDEMIA: ICD-10-CM

## 2020-07-27 DIAGNOSIS — Z79.899 ENCOUNTER FOR LONG-TERM (CURRENT) USE OF MEDICATIONS: ICD-10-CM

## 2020-07-27 DIAGNOSIS — Z79.4 TYPE 2 DIABETES MELLITUS WITHOUT COMPLICATION, WITH LONG-TERM CURRENT USE OF INSULIN (HCC): Primary | ICD-10-CM

## 2020-07-27 DIAGNOSIS — E11.9 TYPE 2 DIABETES MELLITUS WITHOUT COMPLICATION, WITH LONG-TERM CURRENT USE OF INSULIN (HCC): Primary | ICD-10-CM

## 2020-07-27 DIAGNOSIS — Z72.0 TOBACCO ABUSE DISORDER: ICD-10-CM

## 2020-07-27 RX ORDER — INSULIN GLARGINE 100 [IU]/ML
58 INJECTION, SOLUTION SUBCUTANEOUS DAILY
COMMUNITY
End: 2020-07-27 | Stop reason: SDUPTHER

## 2020-07-27 RX ORDER — VARENICLINE TARTRATE 25 MG
KIT ORAL
Qty: 1 DOSE PACK | Refills: 0 | Status: SHIPPED | OUTPATIENT
Start: 2020-07-27 | End: 2021-04-16 | Stop reason: ALTCHOICE

## 2020-07-27 RX ORDER — INSULIN GLARGINE 100 [IU]/ML
55 INJECTION, SOLUTION SUBCUTANEOUS DAILY
Qty: 49.5 ML | Refills: 0 | Status: SHIPPED | OUTPATIENT
Start: 2020-07-27 | End: 2021-04-16 | Stop reason: SDUPTHER

## 2020-07-27 NOTE — PROGRESS NOTES
Lissett Brooks is a 48 y.o. female, evaluated via audio-only technology on 7/27/2020 for Diabetes (3 month follow-up)  . Assessment & Plan:   Diagnoses and all orders for this visit:    1. Type 2 diabetes mellitus without complication, with long-term current use of insulin (HCC) discussed making major changes to diet and exercise. Starting Victoza and will taper up to help with weight loss and mealtime sugars in particular. Checking labs  -     insulin glargine (LANTUS,BASAGLAR) 100 unit/mL (3 mL) inpn; 55 Units by SubCUTAneous route daily for 90 days. -     liraglutide (VICTOZA) 0.6 mg/0.1 mL (18 mg/3 mL) pnij; 0.6 mg once daily for 1 week, then increase to 1.2 mg once daily,  -     HEMOGLOBIN A1C WITH EAG  -     LIPID PANEL  -     METABOLIC PANEL, COMPREHENSIVE    2. Hyperlipidemia LDL goal <100last LDL at goal but triglycerides are running too high so may not be reliable. Rechecking labs  -     HEMOGLOBIN A1C WITH EAG  -     LIPID PANEL  -     METABOLIC PANEL, COMPREHENSIVE    3. Hypertriglyceridemiacontinue on Lipitor and may need to increase dose  -     HEMOGLOBIN A1C WITH EAG  -     LIPID PANEL  -     METABOLIC PANEL, COMPREHENSIVE    4. Class 1 obesity due to excess calories with body mass index (BMI) of 34.0 to 34.9 in adult, unspecified whether serious comorbidity presentencouraged working on weight loss with diet and exercise and adding on Victoza  -     liraglutide (VICTOZA) 0.6 mg/0.1 mL (18 mg/3 mL) pnij; 0.6 mg once daily for 1 week, then increase to 1.2 mg once daily,  -     HEMOGLOBIN A1C WITH EAG  -     LIPID PANEL    5. Tobacco abuse disorderpatient working towards smoking cessation and would like to start Chantix  -     varenicline (CHANTIX STARTER KYLE) 0.5 mg (11)- 1 mg (42) DsPk; Days 1 to 3: 0.5 mg once daily. Days 4 to 7: 0.5 mg twice daily. Maintenance (day 8 and later): 1 mg twice daily;    6. BMI 34.0-34.9,adultas above    7.  Colon cancer screeningopts for FIT testing and will plan for colonoscopy next year  -     OCCULT BLOOD IMMUNOASSAY,DIAGNOSTIC    8. Encounter for long-term (current) use of medications  -     HEMOGLOBIN A1C WITH EAG  -     LIPID PANEL  -     METABOLIC PANEL, COMPREHENSIVE      Follow-up and Dispositions    · Return in about 6 weeks (around 9/7/2020), or if symptoms worsen or fail to improve. 12  Subjective:     Diabetes Mellitus:  Doing ok on Toujeo 58 units and Metformin  Reports no polyuria or polydipsia, no chest pain, dyspnea or TIA's, no numbness, tingling or pain in extremities, last eye exam approximately > 1 yr ago. Exercises regularly with walking. Compliant with diabetic diet. Avoids sodas and sweet teas. Avoids fast food. Limits carbs. Pt is a 1-1.5 ppd smoker. Drinks EtOH about 3-6 drinks over weekend, cutting down on this    Lab Results   Component Value Date/Time    Hemoglobin A1c (POC) 7.6 04/30/2019 04:15 PM    Hemoglobin A1c (POC) 7.3 05/14/2018 02:00 PM    Hemoglobin A1c 8.3 (H) 11/13/2019 04:52 PM    Microalb/Creat ratio (ug/mg creat.) 10.5 11/13/2019 04:52 PM    LDL, calculated 72 11/13/2019 04:52 PM    Creatinine 0.78 11/13/2019 04:52 PM      Lab Results   Component Value Date/Time    GFR est  11/13/2019 04:52 PM    GFR est non-AA 90 11/13/2019 04:52 PM      Lab Results   Component Value Date/Time    TSH 1.390 11/13/2019 04:52 PM       Hyperlipidemia & HTN  Pt is doing well on current meds with no medication side effects noted  No new myalgias, no joint pains, no weakness  No TIA's, no chest pain on exertion, no dyspnea on exertion, no swelling of ankles. Lab Results   Component Value Date/Time    Cholesterol, total 174 11/13/2019 04:52 PM    HDL Cholesterol 31 (L) 11/13/2019 04:52 PM    LDL, calculated 72 11/13/2019 04:52 PM    Triglyceride 356 (H) 11/13/2019 04:52 PM         Prior to Admission medications    Medication Sig Start Date End Date Taking?  Authorizing Provider   insulin glargine (LANTUS,BASAGLAR) 100 unit/mL (3 mL) inpn 55 Units by SubCUTAneous route daily for 90 days. 7/27/20 10/25/20 Yes Lara Cárdenas MD   varenicline (CHANTIX STARTER KYLE) 0.5 mg (11)- 1 mg (42) DsPk Days 1 to 3: 0.5 mg once daily. Days 4 to 7: 0.5 mg twice daily. Maintenance (day 8 and later): 1 mg twice daily; 7/27/20  Yes Lara Cárdenas MD   liraglutide (VICTOZA) 0.6 mg/0.1 mL (18 mg/3 mL) pnij 0.6 mg once daily for 1 week, then increase to 1.2 mg once daily, 7/27/20  Yes Lara Cárdenas MD   escitalopram oxalate (LEXAPRO) 10 mg tablet Take 2 Tabs by mouth daily. 3/15/20  Yes Lara Cárdenas MD   atorvastatin (LIPITOR) 20 mg tablet TAKE ONE TABLET BY MOUTH DAILY. Please call and schedule follow-up appointment with Dr Jessica Martin prior to next refill 3/15/20  Yes Lara Cárdenas MD   Insulin Needles, Disposable, 32 gauge x 5/32\" ndle USE TWO TIMES A DAY AS DIRECTED. Please call office and schedule appointment with Dr Jessica Martin 2/21/20  Yes Lara Cárdenas MD   metFORMIN (GLUCOPHAGE) 1,000 mg tablet TAKE ONE TABLET BY MOUTH TWICE A DAY WITH MEAL. 12/18/19  Yes Lara Cárdenas MD   glucose blood VI test strips (FREESTYLE LITE STRIPS) strip TEST BLOOD SUGAR TWO TIMES A DAY 2/22/19  Yes Lara Cárdenas MD   blood-glucose meter (FREESTYLE FREEDOM LITE)  9/21/18  Yes Provider, Historical   FREESTYLE LANCETS  8/27/18  Yes Provider, Historical   insulin glargine (LANTUS,BASAGLAR) 100 unit/mL (3 mL) inpn 58 Units by SubCUTAneous route daily.   7/27/20  Provider, Historical     Patient Active Problem List   Diagnosis Code    Type II diabetes mellitus ([facility] E11.9    Obesity E66.9    CAP (community acquired pneumonia) J18.9    Tobacco abuse disorder Z72.0     Past Medical History:   Diagnosis Date    Chronic obstructive pulmonary disease ([facility]     possible, put on a rtrial of advair - no PFT's    Diabetes ([address]     H/O seasonal allergies        ROS  Gen - no fever/chills  Resp - no dyspnea or cough  CV - no chest pain or BAR  Rest per HPI    No flowsheet data found. Cora Walsh, who was evaluated through a patient-initiated, synchronous (real-time) audio only encounter, and/or her healthcare decision maker, is aware that it is a billable service, with coverage as determined by her insurance carrier. She provided verbal consent to proceed: Yes. She has not had a related appointment within my department in the past 7 days or scheduled within the next 24 hours.       Total Time: minutes: 21-30 minutes    Noé Nieto MD

## 2020-10-15 DIAGNOSIS — F41.9 ANXIETY: ICD-10-CM

## 2020-10-16 RX ORDER — ESCITALOPRAM OXALATE 10 MG/1
TABLET ORAL
Qty: 60 TAB | Refills: 0 | Status: SHIPPED | OUTPATIENT
Start: 2020-10-16 | End: 2020-11-15 | Stop reason: SDUPTHER

## 2020-11-02 DIAGNOSIS — E11.9 TYPE 2 DIABETES MELLITUS WITHOUT COMPLICATION, WITH LONG-TERM CURRENT USE OF INSULIN (HCC): ICD-10-CM

## 2020-11-02 DIAGNOSIS — E66.09 CLASS 1 OBESITY DUE TO EXCESS CALORIES WITH BODY MASS INDEX (BMI) OF 34.0 TO 34.9 IN ADULT, UNSPECIFIED WHETHER SERIOUS COMORBIDITY PRESENT: ICD-10-CM

## 2020-11-02 DIAGNOSIS — Z79.4 TYPE 2 DIABETES MELLITUS WITHOUT COMPLICATION, WITH LONG-TERM CURRENT USE OF INSULIN (HCC): ICD-10-CM

## 2020-11-02 RX ORDER — LIRAGLUTIDE 6 MG/ML
INJECTION SUBCUTANEOUS
Qty: 90 EACH | Refills: 0 | Status: SHIPPED | OUTPATIENT
Start: 2020-11-02 | End: 2021-04-16 | Stop reason: SDUPTHER

## 2020-11-14 DIAGNOSIS — F41.9 ANXIETY: ICD-10-CM

## 2020-11-15 RX ORDER — ESCITALOPRAM OXALATE 10 MG/1
TABLET ORAL
Qty: 60 TAB | Refills: 0 | Status: SHIPPED | OUTPATIENT
Start: 2020-11-15 | End: 2020-12-14

## 2020-12-30 ENCOUNTER — TELEPHONE (OUTPATIENT)
Dept: FAMILY MEDICINE CLINIC | Age: 50
End: 2020-12-30

## 2020-12-30 DIAGNOSIS — Z79.4 TYPE 2 DIABETES MELLITUS WITHOUT COMPLICATION, WITH LONG-TERM CURRENT USE OF INSULIN (HCC): ICD-10-CM

## 2020-12-30 DIAGNOSIS — F41.9 ANXIETY: ICD-10-CM

## 2020-12-30 DIAGNOSIS — E11.9 TYPE 2 DIABETES MELLITUS WITHOUT COMPLICATION, WITH LONG-TERM CURRENT USE OF INSULIN (HCC): ICD-10-CM

## 2020-12-30 DIAGNOSIS — E78.5 HYPERLIPIDEMIA LDL GOAL <100: ICD-10-CM

## 2020-12-30 RX ORDER — ESCITALOPRAM OXALATE 10 MG/1
20 TABLET ORAL DAILY
Qty: 180 TAB | Refills: 0 | Status: SHIPPED | OUTPATIENT
Start: 2020-12-30 | End: 2021-04-16 | Stop reason: SDUPTHER

## 2020-12-30 RX ORDER — ATORVASTATIN CALCIUM 20 MG/1
TABLET, FILM COATED ORAL
Qty: 90 TAB | Refills: 0 | Status: SHIPPED | OUTPATIENT
Start: 2020-12-30 | End: 2021-04-16 | Stop reason: SDUPTHER

## 2020-12-30 NOTE — TELEPHONE ENCOUNTER
----- Message from Franci Padilla sent at 12/30/2020 10:22 AM EST -----  Regarding: Dr Blanca Press: [phone]  Medication Refill    Caller (if not patient):      Relationship of caller (if not patient):      Best contact number(s):[phone]      Name of medication and dosage if known:atorvastatin 20 mg, Es citalopram 10 mg      Is patient out of this medication (yes/no):yes      Pharmacy name:[facility] pharmacy on [address] [place] [address] ,[address] [place] [address] listed in chart? (yes/no):no  Pharmacy phone number:[phone]      Details to clarify the request:      Franci Padilla

## 2020-12-30 NOTE — TELEPHONE ENCOUNTER
PCP: Spencer Tom MD    Last appt: 7/27/20  No future appointments. Requested Prescriptions     Pending Prescriptions Disp Refills    escitalopram oxalate (LEXAPRO) 10 mg tablet 180 Tab 0     Sig: Take 2 Tabs by mouth daily.     atorvastatin (LIPITOR) 20 mg tablet 90 Tab 0     Sig: TAKE ONE TABLET BY MOUTH DAILY       Refill pending for provider approval.

## 2021-02-08 DIAGNOSIS — E66.09 CLASS 1 OBESITY DUE TO EXCESS CALORIES WITH BODY MASS INDEX (BMI) OF 34.0 TO 34.9 IN ADULT, UNSPECIFIED WHETHER SERIOUS COMORBIDITY PRESENT: ICD-10-CM

## 2021-02-08 DIAGNOSIS — Z79.4 TYPE 2 DIABETES MELLITUS WITHOUT COMPLICATION, WITH LONG-TERM CURRENT USE OF INSULIN (HCC): ICD-10-CM

## 2021-02-08 DIAGNOSIS — E11.9 TYPE 2 DIABETES MELLITUS WITHOUT COMPLICATION, WITH LONG-TERM CURRENT USE OF INSULIN (HCC): ICD-10-CM

## 2021-02-08 RX ORDER — LIRAGLUTIDE 6 MG/ML
INJECTION SUBCUTANEOUS
Qty: 90 EACH | Refills: 0 | Status: CANCELLED | OUTPATIENT
Start: 2021-02-08

## 2021-02-08 RX ORDER — METFORMIN HYDROCHLORIDE 1000 MG/1
TABLET ORAL
Qty: 60 TAB | Refills: 0 | Status: CANCELLED | OUTPATIENT
Start: 2021-02-08

## 2021-03-03 DIAGNOSIS — E11.9 TYPE 2 DIABETES MELLITUS WITHOUT COMPLICATION, WITH LONG-TERM CURRENT USE OF INSULIN (HCC): ICD-10-CM

## 2021-03-03 DIAGNOSIS — Z79.4 TYPE 2 DIABETES MELLITUS WITHOUT COMPLICATION, WITH LONG-TERM CURRENT USE OF INSULIN (HCC): ICD-10-CM

## 2021-03-03 NOTE — TELEPHONE ENCOUNTER
----- Message from Guera Ladd sent at 3/3/2021 11:31 AM EST -----  Regarding: Dr. Loulou Gamboa  Medication Refill    Caller (if not patient):n/a      Relationship of caller (if not patient): self      Best contact number(s): [phone]      Name of medication and dosage if known: Hugh Nielsen      Is patient out of this medication (yes/no):yes      [facility] listed in chart? (yes/no):no  Pharmacy phone number: [phone]      Details to clarify the request: Les Foote requesting Rx \"Metforman\" be filled at [facility] pharmacy [address] [place] [address]

## 2021-03-04 RX ORDER — METFORMIN HYDROCHLORIDE 1000 MG/1
TABLET ORAL
Qty: 180 TAB | Refills: 1 | Status: SHIPPED | OUTPATIENT
Start: 2021-03-04

## 2021-04-16 ENCOUNTER — OFFICE VISIT (OUTPATIENT)
Dept: FAMILY MEDICINE CLINIC | Age: 51
End: 2021-04-16
Payer: COMMERCIAL

## 2021-04-16 VITALS
TEMPERATURE: 97.3 F | WEIGHT: 211.6 LBS | SYSTOLIC BLOOD PRESSURE: 124 MMHG | BODY MASS INDEX: 33.21 KG/M2 | RESPIRATION RATE: 16 BRPM | HEIGHT: 67 IN | HEART RATE: 93 BPM | DIASTOLIC BLOOD PRESSURE: 70 MMHG | OXYGEN SATURATION: 96 %

## 2021-04-16 DIAGNOSIS — E66.09 CLASS 1 OBESITY DUE TO EXCESS CALORIES WITH BODY MASS INDEX (BMI) OF 33.0 TO 33.9 IN ADULT, UNSPECIFIED WHETHER SERIOUS COMORBIDITY PRESENT: ICD-10-CM

## 2021-04-16 DIAGNOSIS — Z72.0 TOBACCO ABUSE DISORDER: ICD-10-CM

## 2021-04-16 DIAGNOSIS — Z00.00 WELL ADULT EXAM: Primary | ICD-10-CM

## 2021-04-16 DIAGNOSIS — E78.5 HYPERLIPIDEMIA LDL GOAL <100: ICD-10-CM

## 2021-04-16 DIAGNOSIS — E11.9 COMPREHENSIVE DIABETIC FOOT EXAMINATION, TYPE 2 DM, ENCOUNTER FOR (HCC): ICD-10-CM

## 2021-04-16 DIAGNOSIS — E11.9 TYPE 2 DIABETES MELLITUS WITHOUT COMPLICATION, WITH LONG-TERM CURRENT USE OF INSULIN (HCC): ICD-10-CM

## 2021-04-16 DIAGNOSIS — F41.9 ANXIETY: ICD-10-CM

## 2021-04-16 DIAGNOSIS — Z11.59 SCREENING FOR VIRAL DISEASE: ICD-10-CM

## 2021-04-16 DIAGNOSIS — Z12.31 ENCOUNTER FOR SCREENING MAMMOGRAM FOR MALIGNANT NEOPLASM OF BREAST: ICD-10-CM

## 2021-04-16 DIAGNOSIS — Z79.4 TYPE 2 DIABETES MELLITUS WITHOUT COMPLICATION, WITH LONG-TERM CURRENT USE OF INSULIN (HCC): ICD-10-CM

## 2021-04-16 DIAGNOSIS — Z12.11 COLON CANCER SCREENING: ICD-10-CM

## 2021-04-16 PROCEDURE — 99396 PREV VISIT EST AGE 40-64: CPT | Performed by: FAMILY MEDICINE

## 2021-04-16 PROCEDURE — 99214 OFFICE O/P EST MOD 30 MIN: CPT | Performed by: FAMILY MEDICINE

## 2021-04-16 RX ORDER — ATORVASTATIN CALCIUM 20 MG/1
TABLET, FILM COATED ORAL
Qty: 90 TAB | Refills: 1 | Status: SHIPPED | OUTPATIENT
Start: 2021-04-16

## 2021-04-16 RX ORDER — ESCITALOPRAM OXALATE 10 MG/1
20 TABLET ORAL DAILY
Qty: 180 TAB | Refills: 1 | Status: SHIPPED | OUTPATIENT
Start: 2021-04-16

## 2021-04-16 RX ORDER — LIRAGLUTIDE 6 MG/ML
1.8 INJECTION SUBCUTANEOUS DAILY
Qty: 162 MG | Refills: 1 | Status: SHIPPED | OUTPATIENT
Start: 2021-04-16

## 2021-04-16 RX ORDER — PEN NEEDLE, DIABETIC 31 GX3/16"
NEEDLE, DISPOSABLE MISCELLANEOUS
Qty: 100 PEN NEEDLE | Refills: 3 | Status: SHIPPED | OUTPATIENT
Start: 2021-04-16

## 2021-04-16 RX ORDER — INSULIN GLARGINE 100 [IU]/ML
40 INJECTION, SOLUTION SUBCUTANEOUS DAILY
Qty: 36 ML | Refills: 1 | Status: SHIPPED | OUTPATIENT
Start: 2021-04-16

## 2021-04-16 NOTE — PROGRESS NOTES
Subjective:     Chief Complaint   Patient presents with    Complete Physical     Annual        She  is a 48 y.o. female who presents for evaluation of: CPE  Doing well today. Has not followed up in 1 year during pandemic. Moved to [place]. Weight down 7 lbs since last visit. Diabetes Mellitus: Ran out of insulin and victoza 3 months ago. Still taking Metformin  Reports no polyuria or polydipsia, no chest pain, dyspnea or TIA's, no numbness, tingling or pain in extremities, last eye exam approximately > 1 yr ago. Family with eye doctors so will set up appt on her own. Exercises regularly with walking and working on house  Compliant with diabetic diet. Avoids sodas and sweet teas. Avoids fast food. Limits carbs. Pt is a smoker - about 1ppd  Drinks EtOH about 3-6 drinks over weekend.       Lab Results   Component Value Date/Time    Hemoglobin A1c (POC) 7.6 04/30/2019 04:15 PM    Hemoglobin A1c (POC) 7.3 05/14/2018 02:00 PM    Hemoglobin A1c 8.3 (H) 11/13/2019 04:52 PM    Microalb/Creat ratio (ug/mg creat.) 10.5 11/13/2019 04:52 PM    LDL, calculated 72 11/13/2019 04:52 PM    Creatinine 0.78 11/13/2019 04:52 PM      Lab Results   Component Value Date/Time    GFR est  11/13/2019 04:52 PM    GFR est non-AA 90 11/13/2019 04:52 PM      Lab Results   Component Value Date/Time    TSH 1.390 11/13/2019 04:52 PM       ROS:  Constitutional: negative for fevers, chills and fatigue  Eyes: negative for visual disturbance  Ears, nose, mouth, throat, and face: + Allergies  Respiratory: negative for cough or dyspnea on exertion  Cardiovascular: negative for chest pain, dyspnea, palpitations, fatigue  Gastrointestinal: negative for nausea, vomiting, change in bowel habits, diarrhea and abdominal pain  Genitourinary:negative for frequency and dysuria  Integument/breast: negative for rash and skin lesion(s)  Hematologic/lymphatic: negative for easy bruising and bleeding  Musculoskeletal: No muscle aches or joint pains  Neurological: negative for headaches and dizziness  Behavioral/Psych: anxiety doing well on Lexapro     Objective:     Vitals:    04/16/21 0820   BP: 124/70   Pulse: 93   Resp: 16   Temp: 97.3 °F (36.3 °C)   TempSrc: Temporal   SpO2: 96%   Weight: 211 lb 9.6 oz (96 kg)   Height: 5' 6.8\" (1.697 m)     Physical Examination:  General appearance - alert, well appearing, and in no distress  Eyes - sclera anicteric  Ears - nml TMs bilat  Neck - supple, no significant adenopathy  Lymphatics - no palpable lymphadenopathy, no hepatosplenomegaly  Chest - clear to auscultation, no wheezes, rales or rhonchi, symmetric air entry  Heart - normal rate, regular rhythm, normal S1, S2, no murmurs, rubs, clicks or gallops  Abdomen - soft, nontender, nondistended, no masses or organomegaly  Breasts & Pelvic - exam declined by the patient as done by OB/GYN  Neurological - alert, oriented, normal speech, no focal findings or movement disorder noted  Extremities - no edema  Skin - normal coloration and turgor, no rashes, no suspicious skin lesions noted  Psych - nml mood and affect  Diabetic foot exam:     Left Foot:   Visual Exam: normal    Vibratory sensation: normal      Right Foot:   Visual Exam: normal    Vibratory sensation: normal      Past Medical History:   Diagnosis Date    Chronic obstructive pulmonary disease (HCC)     possible, put on a rtrial of advair - no PFT's    Diabetes ([facility] [address]     H/O seasonal allergies      History reviewed. No pertinent surgical history. Current Outpatient Medications on File Prior to Visit   Medication Sig Dispense Refill    metFORMIN (GLUCOPHAGE) 1,000 mg tablet TAKE ONE TABLET BY MOUTH TWICE A DAY WITH MEALS 180 Tab 1    glucose blood VI test strips (FREESTYLE LITE STRIPS) strip TEST BLOOD SUGAR TWO TIMES A DAY 50 Strip 0    blood-glucose meter (FREESTYLE FREEDOM LITE)       FREESTYLE LANCETS       [DISCONTINUED] escitalopram oxalate (LEXAPRO) 10 mg tablet Take 2 Tabs by mouth daily. 180 Tab 0    [DISCONTINUED] atorvastatin (LIPITOR) 20 mg tablet TAKE ONE TABLET BY MOUTH DAILY 90 Tab 0    [DISCONTINUED] liraglutide (Victoza 2-Kyle) 0.6 mg/0.1 mL (18 mg/3 mL) pnij DIAL AND INJECT UNDER THE SKIN 1.2 MG DAILY 90 Each 0    [DISCONTINUED] insulin glargine (LANTUS,BASAGLAR) 100 unit/mL (3 mL) inpn 55 Units by SubCUTAneous route daily for 90 days. 49.5 mL 0    [DISCONTINUED] varenicline (CHANTIX STARTER KYLE) 0.5 mg (11)- 1 mg (42) DsPk Days 1 to 3: 0.5 mg once daily. Days 4 to 7: 0.5 mg twice daily. Maintenance (day 8 and later): 1 mg twice daily; 1 Dose Pack 0    [DISCONTINUED] Insulin Needles, Disposable, 32 gauge x 5/32\" ndle USE TWO TIMES A DAY AS DIRECTED. Please call office and schedule appointment with Dr Duarte Grade 50 Pen Needle 0     No current facility-administered medications on file prior to visit. Assessment/ Plan:  Off medication for 3 months. Checking labs today. Restarting Victoza and insulin. Reviewed slow titration of Victoza as well as using a lower dose of Lantus. She will start at Lantus 20 units daily and titrate up if needed based on fasting sugars. Encouraged continued work on weight loss. She will be transitioning to another primary care doctor in [place] where she recently moved and will also plan to find a GYN for her Pap smear in [place]. She will see an eye doctor since she has eye doctors in her family. Plans to do this after getting her second Covid vaccine soon. Diagnoses and all orders for this visit:    1. Well adult exam  -     escitalopram oxalate (LEXAPRO) 10 mg tablet; Take 2 Tabs by mouth daily. -     atorvastatin (LIPITOR) 20 mg tablet; TAKE ONE TABLET BY MOUTH DAILY  -     HM DIABETES FOOT EXAM  -     [facility] MAMMO BI SCREENING INCL CAD; Future  -     MICROALBUMIN, UR, RAND W/ MICROALB/CREAT RATIO; Future  -     HEPATITIS C AB; Future  -     HEMOGLOBIN A1C WITH EAG; Future  -     LIPID PANEL;  Future  -     METABOLIC PANEL, COMPREHENSIVE; Future  -     TSH 3RD GENERATION; Future  -     CBC W/O DIFF; Future  -     URINALYSIS W/ RFLX MICROSCOPIC; Future  -     VITAMIN D, 25 HYDROXY; Future  -     COLOGUARD TEST (FECAL DNA COLORECTAL CANCER SCREENING)    2. Anxiety  -     escitalopram oxalate (LEXAPRO) 10 mg tablet; Take 2 Tabs by mouth daily. 3. Hyperlipidemia LDL goal <100  -     atorvastatin (LIPITOR) 20 mg tablet; TAKE ONE TABLET BY MOUTH DAILY  -     HEMOGLOBIN A1C WITH EAG; Future  -     LIPID PANEL; Future  -     METABOLIC PANEL, COMPREHENSIVE; Future  -     TSH 3RD GENERATION; Future    4. Type 2 diabetes mellitus without complication, with long-term current use of insulin (HCC)  -     atorvastatin (LIPITOR) 20 mg tablet; TAKE ONE TABLET BY MOUTH DAILY  -     MICROALBUMIN, UR, RAND W/ MICROALB/CREAT RATIO; Future  -     HEMOGLOBIN A1C WITH EAG; Future  -     LIPID PANEL; Future  -     METABOLIC PANEL, COMPREHENSIVE; Future  -     TSH 3RD GENERATION; Future  -     VITAMIN D, 25 HYDROXY; Future  -     liraglutide (Victoza 2-Bhupendra) 0.6 mg/0.1 mL (18 mg/3 mL) pnij; 1.8 mg by SubCUTAneous route daily. Please apply coupon  -     Insulin Needles, Disposable, 32 gauge x 5/32\" ndle; USE TWO TIMES A DAY AS DIRECTED - please dispense low cost Relion brand  -     insulin glargine (LANTUS,BASAGLAR) 100 unit/mL (3 mL) inpn; 40 Units by SubCUTAneous route daily. 5. Comprehensive diabetic foot examination, type 2 DM, encounter for ([facility]  -      DIABETES FOOT EXAM    6. Encounter for screening mammogram for malignant neoplasm of breast  -     [facility] MAMMO BI SCREENING INCL CAD; Future    7. Tobacco abuse disorder    8. Class 1 obesity due to excess calories with body mass index (BMI) of 33.0 to 33.9 in adult, unspecified whether serious comorbidity present  -     VITAMIN D, 25 HYDROXY; Future  -     liraglutide (Victoza 2-Bhupendra) 0.6 mg/0.1 mL (18 mg/3 mL) pnij; 1.8 mg by SubCUTAneous route daily. Please apply coupon    9.  Screening for viral disease  - HEPATITIS C AB; Future    10. Colon cancer screening  -     COLOGUARD TEST (FECAL DNA COLORECTAL CANCER SCREENING)        I have discussed the diagnosis with the patient and the intended plan as seen in the above orders. The patient has received an after-visit summary and questions were answered concerning future plans. I have discussed medication side effects and warnings with the patient as well. The patient verbalizes understanding and agreement with the plan. Follow-up and Dispositions    · Return in about 3 months (around 7/16/2021), or if symptoms worsen or fail to improve.

## 2021-04-16 NOTE — PROGRESS NOTES
Chief Complaint   Patient presents with    Complete Physical     Annual   1. Have you been to the ER, urgent care clinic since your last visit? Hospitalized since your last visit? No    2. Have you seen or consulted any other health care providers outside of the [address] [place] [address] since your last visit? Include any pap smears or colon screening.  No

## 2021-07-30 ENCOUNTER — TELEPHONE (OUTPATIENT)
Dept: FAMILY MEDICINE CLINIC | Age: 51
End: 2021-07-30

## 2021-08-31 ENCOUNTER — PATIENT MESSAGE (OUTPATIENT)
Dept: FAMILY MEDICINE CLINIC | Age: 51
End: 2021-08-31

## 2024-02-28 ENCOUNTER — APPOINTMENT (OUTPATIENT)
Facility: HOSPITAL | Age: 54
End: 2024-02-28
Payer: COMMERCIAL

## 2024-02-28 ENCOUNTER — HOSPITAL ENCOUNTER (EMERGENCY)
Facility: HOSPITAL | Age: 54
Discharge: HOME | End: 2024-02-28
Attending: STUDENT IN AN ORGANIZED HEALTH CARE EDUCATION/TRAINING PROGRAM
Payer: COMMERCIAL

## 2024-02-28 VITALS
HEIGHT: 68 IN | RESPIRATION RATE: 17 BRPM | OXYGEN SATURATION: 100 % | SYSTOLIC BLOOD PRESSURE: 126 MMHG | BODY MASS INDEX: 31.83 KG/M2 | DIASTOLIC BLOOD PRESSURE: 71 MMHG | WEIGHT: 210 LBS | TEMPERATURE: 98.2 F | HEART RATE: 106 BPM

## 2024-02-28 DIAGNOSIS — S42.255A CLOSED NONDISPLACED FRACTURE OF GREATER TUBEROSITY OF LEFT HUMERUS, INITIAL ENCOUNTER: ICD-10-CM

## 2024-02-28 DIAGNOSIS — S42.212A CLOSED FRACTURE OF NECK OF LEFT HUMERUS, INITIAL ENCOUNTER: Primary | ICD-10-CM

## 2024-02-28 PROCEDURE — 73060 X-RAY EXAM OF HUMERUS: CPT

## 2024-02-28 PROCEDURE — 6370000000 HC RX 637 (ALT 250 FOR IP): Performed by: STUDENT IN AN ORGANIZED HEALTH CARE EDUCATION/TRAINING PROGRAM

## 2024-02-28 PROCEDURE — 99283 EMERGENCY DEPT VISIT LOW MDM: CPT

## 2024-02-28 RX ORDER — IBUPROFEN 600 MG/1
600 TABLET ORAL
Status: COMPLETED | OUTPATIENT
Start: 2024-02-28 | End: 2024-02-28

## 2024-02-28 RX ORDER — METHOCARBAMOL 750 MG/1
750 TABLET, FILM COATED ORAL 3 TIMES DAILY PRN
Qty: 20 TABLET | Refills: 0 | Status: SHIPPED | OUTPATIENT
Start: 2024-02-28

## 2024-02-28 RX ORDER — IBUPROFEN 600 MG/1
600 TABLET ORAL EVERY 8 HOURS PRN
Qty: 20 TABLET | Refills: 0 | Status: SHIPPED | OUTPATIENT
Start: 2024-02-28

## 2024-02-28 RX ORDER — OXYCODONE HYDROCHLORIDE 5 MG/1
5 TABLET ORAL
Status: COMPLETED | OUTPATIENT
Start: 2024-02-28 | End: 2024-02-28

## 2024-02-28 RX ORDER — METHOCARBAMOL 500 MG/1
1000 TABLET, FILM COATED ORAL ONCE
Status: COMPLETED | OUTPATIENT
Start: 2024-02-28 | End: 2024-02-28

## 2024-02-28 RX ORDER — ACETAMINOPHEN 500 MG
1000 TABLET ORAL ONCE
Status: COMPLETED | OUTPATIENT
Start: 2024-02-28 | End: 2024-02-28

## 2024-02-28 RX ORDER — HYDROCODONE BITARTRATE AND ACETAMINOPHEN 5; 325 MG/1; MG/1
1 TABLET ORAL EVERY 6 HOURS PRN
Qty: 12 TABLET | Refills: 0 | Status: SHIPPED | OUTPATIENT
Start: 2024-02-28 | End: 2024-03-02

## 2024-02-28 RX ADMIN — METHOCARBAMOL 1000 MG: 500 TABLET ORAL at 06:20

## 2024-02-28 RX ADMIN — OXYCODONE 5 MG: 5 TABLET ORAL at 06:21

## 2024-02-28 RX ADMIN — ACETAMINOPHEN 1000 MG: 500 TABLET ORAL at 06:21

## 2024-02-28 RX ADMIN — IBUPROFEN 600 MG: 600 TABLET, FILM COATED ORAL at 06:21

## 2024-02-28 ASSESSMENT — PAIN DESCRIPTION - ORIENTATION: ORIENTATION: LEFT

## 2024-02-28 ASSESSMENT — LIFESTYLE VARIABLES
HOW OFTEN DO YOU HAVE A DRINK CONTAINING ALCOHOL: NEVER
HOW MANY STANDARD DRINKS CONTAINING ALCOHOL DO YOU HAVE ON A TYPICAL DAY: PATIENT DOES NOT DRINK

## 2024-02-28 ASSESSMENT — PAIN SCALES - GENERAL: PAINLEVEL_OUTOF10: 5

## 2024-02-28 ASSESSMENT — PAIN DESCRIPTION - LOCATION: LOCATION: ARM

## 2024-02-28 NOTE — DISCHARGE INSTRUCTIONS
Thank you!    Thank you for allowing me to care for you in the emergency department.  I sincerely hope that you are satisfied with your visit today.  It is my goal to provide you with excellent care.    Below you will find a list of your labs and imaging from your visit today if applicable. Should you have any questions regarding these results please do not hesitate to call the emergency department. Please review [facility] for a more detailed result list since the below list may not be comprehensive. Instructions on how to sign up to [facility] should be provided in this packet.    Labs -  No results found for this or any previous visit (from the past 12 hour(s)).    Radiologic Studies -   XR HUMERUS LEFT (MIN 2 VIEWS)   Final Result   Acute nondisplaced left humeral neck and greater tuberosity   fracture.             If you feel that you have not received excellent quality care or timely care, please ask to speak to the nurse manager. Please choose us in the future for your continued health care needs.   ------------------------------------------------------------------------------------------------------------  The exam and treatment you received in the Emergency Department were for an urgent problem and are not intended as complete care. It is important that you follow-up with a doctor, nurse practitioner, or physician assistant to:  (1) confirm your diagnosis,  (2) re-evaluation of changes in your illness and treatment, and  (3) for ongoing care.  If your symptoms become worse or you do not improve as expected and you are unable to reach your usual health care provider, you should return to the Emergency Department. We are available 24 hours a day.     Please take your discharge instructions with you when you go to your follow-up appointment.     If a prescription has been provided, please have it filled as soon as possible to prevent a delay in treatment. Read the entire medication instruction sheet provided to you

## 2024-02-28 NOTE — ED TRIAGE NOTES
Patient states she tripped over her dog last night and landed on her left arm in attempt to catch herself. C/o pain in L bicep.

## 2024-02-28 NOTE — ED PROVIDER NOTES
[facility]  EMERGENCY DEPARTMENT ENCOUNTER NOTE    Date: 2/28/2024  Patient Name: Kendra López    History of Presenting Illness     Chief Complaint   Patient presents with    Fall     History obtained from: Patient    HPI: Kendra López, 53 y.o. female with past medical history as listed and reviewed below presents for left arm pain after a fall.  She tripped over the dog and fell down on her left hand.  She had acute onset pain in the mid left arm that worsens when she moves her bicep.  She has mid arm tenderness.  No weakness or numbness in the hand.  No head trauma.  Not on blood thinners.  No other complaints..    Medical History   I reviewed the medical, surgical, family, and social history, as well as allergies:    PCP: Amadou Macias MD    Past Medical History:  Past Medical History:   Diagnosis Date    Chronic obstructive pulmonary disease (HCC)     possible, put on a rtrial of advair - no PFT's    Diabetes (HCC)     H/O seasonal allergies      Past Surgical History:  History reviewed. No pertinent surgical history.  Current Outpatient Medications:  Current Outpatient Medications   Medication Instructions    atorvastatin (LIPITOR) 20 MG tablet 1 tablet, Oral, DAILY    escitalopram (LEXAPRO) 20 mg, Oral, DAILY    HYDROcodone-acetaminophen (NORCO) 5-325 MG per tablet 1 tablet, Oral, EVERY 6 HOURS PRN, Intended supply: 3 days. Take lowest dose possible to manage pain    ibuprofen (ADVIL;MOTRIN) 600 mg, Oral, EVERY 8 HOURS PRN    Lantus SoloStar 40 Units, SubCUTAneous, DAILY    metFORMIN (GLUCOPHAGE) 1000 MG tablet 1 tablet, Oral, 2 TIMES DAILY WITH MEALS    methocarbamol (ROBAXIN-750) 750 mg, Oral, 3 TIMES DAILY PRN    Tylenol 650 mg, Oral, EVERY 6 HOURS PRN    Victoza 1.8 mg, SubCUTAneous, DAILY      Family History:  Family History   Problem Relation Age of Onset    Dementia Mother     Alzheimer's Disease Father     Heart Disease Father     Alzheimer's

## 2024-03-19 ENCOUNTER — OFFICE VISIT (OUTPATIENT)
Age: 54
End: 2024-03-19
Payer: COMMERCIAL

## 2024-03-19 VITALS — BODY MASS INDEX: 31.83 KG/M2 | HEIGHT: 68 IN | WEIGHT: 210 LBS

## 2024-03-19 DIAGNOSIS — S42.295A OTHER CLOSED NONDISPLACED FRACTURE OF PROXIMAL END OF LEFT HUMERUS, INITIAL ENCOUNTER: Primary | ICD-10-CM

## 2024-03-19 DIAGNOSIS — S42.255A NONDISPLACED FRACTURE OF GREATER TUBEROSITY OF LEFT HUMERUS, INITIAL ENCOUNTER FOR CLOSED FRACTURE: ICD-10-CM

## 2024-03-19 PROCEDURE — 99203 OFFICE O/P NEW LOW 30 MIN: CPT | Performed by: ORTHOPAEDIC SURGERY

## 2024-03-19 ASSESSMENT — PATIENT HEALTH QUESTIONNAIRE - PHQ9
SUM OF ALL RESPONSES TO PHQ QUESTIONS 1-9: 0
SUM OF ALL RESPONSES TO PHQ QUESTIONS 1-9: 0
SUM OF ALL RESPONSES TO PHQ9 QUESTIONS 1 & 2: 0
SUM OF ALL RESPONSES TO PHQ QUESTIONS 1-9: 0
1. LITTLE INTEREST OR PLEASURE IN DOING THINGS: NOT AT ALL
2. FEELING DOWN, DEPRESSED OR HOPELESS: NOT AT ALL
SUM OF ALL RESPONSES TO PHQ QUESTIONS 1-9: 0

## 2024-03-19 NOTE — PROGRESS NOTES
Identified pt with two pt identifiers (name and ). Reviewed chart in preparation for visit and have obtained necessary documentation.    Kendra López is a 53 y.o. female  Chief Complaint   Patient presents with    New Patient     L shoulder injury, aching feeling in her L bicep  Patient states that she tripped over her dog and fell.     Ht 1.727 m (5' 8\")   Wt 95.3 kg (210 lb)   BMI 31.93 kg/m²     1. Have you been to the ER, urgent care clinic since your last visit?  Hospitalized since your last visit?no    2. Have you seen or consulted any other health care providers outside of the [facility] since your last visit?  Include any pap smears or colon screening. no

## 2024-03-19 NOTE — PROGRESS NOTES
Subjective:      Patient ID: Kendra López is a 53 y.o.  female.    Chief Complaint   Patient presents with    New Patient     L shoulder injury, aching feeling in her L bicep  Patient states that she tripped over her dog and fell.     The patient is a 53-year-old  who works from home, who presents today for a follow-up evaluation from [facility] emergency department for an acute left proximal humerus fracture sustained when she fell when she tripped over her dog at home.  She went to the [facility] ED and was evaluated by Dr. Zazueta on 2024, where x-rays showed a nondisplaced humeral neck and greater tuberosity fracture.  She was placed in a sling for which she has been compliant.  She has not had a significant mount of pain.  She has had some difficulty working on a computer each day because she cannot get her left hand to the keyboard effectively.  She has not had any x-rays since those obtained on 2024.  She denies any rating pain down the arm, weakness, or paresthesias.  No previous injury to the left shoulder.     Social History     Occupational History    Not on file   Tobacco Use    Smoking status: Every Day     Current packs/day: 1.00     Types: Cigarettes    Smokeless tobacco: Never    Tobacco comments:     Quit smokin yr hx   Substance and Sexual Activity    Alcohol use: Yes     Alcohol/week: 5.0 standard drinks of alcohol    Drug use: Yes     Types: Marijuana (Weed)    Sexual activity: Not on file      Review of Systems  Patient denies any fevers or chills.  Denies any chest pain or shortness of breath or abdominal pain.  Only pain is left shoulder.  Denies any elbow or wrist or hand pain left upper extremity.  Denies any rating pain down the arm and denies any numbness or tingling or paresthesias or weakness in the left hand.  Peripheral vascular normal.  Objective:   Ortho Exam  Evaluation left shoulder reveals mild to moderate

## 2024-04-09 ENCOUNTER — HOSPITAL ENCOUNTER (OUTPATIENT)
Facility: HOSPITAL | Age: 54
Discharge: HOME | End: 2024-04-12
Payer: COMMERCIAL

## 2024-04-09 DIAGNOSIS — S42.255A NONDISPLACED FRACTURE OF GREATER TUBEROSITY OF LEFT HUMERUS, INITIAL ENCOUNTER FOR CLOSED FRACTURE: ICD-10-CM

## 2024-04-09 DIAGNOSIS — S42.295A OTHER CLOSED NONDISPLACED FRACTURE OF PROXIMAL END OF LEFT HUMERUS, INITIAL ENCOUNTER: ICD-10-CM

## 2024-04-09 PROCEDURE — 73030 X-RAY EXAM OF SHOULDER: CPT

## 2024-04-10 ENCOUNTER — OFFICE VISIT (OUTPATIENT)
Age: 54
End: 2024-04-10
Payer: COMMERCIAL

## 2024-04-10 VITALS
DIASTOLIC BLOOD PRESSURE: 83 MMHG | TEMPERATURE: 96.8 F | WEIGHT: 210 LBS | SYSTOLIC BLOOD PRESSURE: 132 MMHG | RESPIRATION RATE: 18 BRPM | HEIGHT: 68 IN | BODY MASS INDEX: 31.83 KG/M2 | OXYGEN SATURATION: 94 % | HEART RATE: 86 BPM

## 2024-04-10 DIAGNOSIS — S42.295A OTHER CLOSED NONDISPLACED FRACTURE OF PROXIMAL END OF LEFT HUMERUS, INITIAL ENCOUNTER: Primary | ICD-10-CM

## 2024-04-10 DIAGNOSIS — S42.255A NONDISPLACED FRACTURE OF GREATER TUBEROSITY OF LEFT HUMERUS, INITIAL ENCOUNTER FOR CLOSED FRACTURE: ICD-10-CM

## 2024-04-10 PROCEDURE — 99212 OFFICE O/P EST SF 10 MIN: CPT | Performed by: ORTHOPAEDIC SURGERY

## 2024-04-10 ASSESSMENT — PATIENT HEALTH QUESTIONNAIRE - PHQ9
SUM OF ALL RESPONSES TO PHQ QUESTIONS 1-9: 0
SUM OF ALL RESPONSES TO PHQ9 QUESTIONS 1 & 2: 0
SUM OF ALL RESPONSES TO PHQ QUESTIONS 1-9: 0
1. LITTLE INTEREST OR PLEASURE IN DOING THINGS: NOT AT ALL
2. FEELING DOWN, DEPRESSED OR HOPELESS: NOT AT ALL
SUM OF ALL RESPONSES TO PHQ QUESTIONS 1-9: 0
SUM OF ALL RESPONSES TO PHQ QUESTIONS 1-9: 0

## 2024-04-10 NOTE — PROGRESS NOTES
Subjective:      Patient ID: Kendra López is a 53 y.o.  female.    Chief Complaint   Patient presents with    Follow-up     L shoulder pain   Patient returns in follow-up of the left proximal humerus fracture sustained approximately 6 weeks ago.  She is here for a follow-up x-ray evaluation.  She has been compliant with wearing the sling and has had diminished pain in her shoulder.  She has not had any reinjury..  She was noted to have a nondisplaced acute left proximal humerus neck fracture and nondisplaced greater tuberosity fracture.    HISTORY: The patient is a 53-year-old  who works from home, who presented on 3/19/2024, for a follow-up evaluation from [facility] emergency department for an acute left proximal humerus fracture sustained when she fell when she tripped over her dog at home.  She went to the [facility] ED and was evaluated by Dr. Zazueta on 2024, where x-rays showed a nondisplaced humeral neck and greater tuberosity fracture.  She was placed in a sling for which she has been compliant.  She has not had a significant mount of pain.  She has had some difficulty working on a computer each day because she cannot get her left hand to the keyboard effectively.  She has not had any x-rays since those obtained on 2024.  She denies any rating pain down the arm, weakness, or paresthesias.  No previous injury to the left shoulder.       Social History     Occupational History    Not on file   Tobacco Use    Smoking status: Former     Types: Cigarettes     Start date: 3/20/2024    Smokeless tobacco: Never    Tobacco comments:     Quit smokin yr hx   Substance and Sexual Activity    Alcohol use: Yes     Alcohol/week: 5.0 standard drinks of alcohol    Drug use: Yes     Types: Marijuana (Weed)    Sexual activity: Not on file      Review of Systems  Patient denies any fevers or chills. Denies any chest pain or shortness of breath or

## 2024-04-10 NOTE — PROGRESS NOTES
Identified pt with two pt identifiers (name and ). Reviewed chart in preparation for visit and have obtained necessary documentation.    Kendra López is a 53 y.o. female  Chief Complaint   Patient presents with    Follow-up     L shoulder pain     /83 (Site: Right Upper Arm, Position: Sitting, Cuff Size: Medium Adult)   Pulse 86   Temp 96.8 °F (36 °C) (Oral)   Resp 18   Ht 1.727 m (5' 8\")   Wt 95.3 kg (210 lb)   SpO2 94%   BMI 31.93 kg/m²     1. Have you been to the ER, urgent care clinic since your last visit?  Hospitalized since your last visit?no    2. Have you seen or consulted any other health care providers outside of the [facility] since your last visit?  Include any pap smears or colon screening. no

## 2024-05-08 ENCOUNTER — OFFICE VISIT (OUTPATIENT)
Age: 54
End: 2024-05-08
Payer: COMMERCIAL

## 2024-05-08 ENCOUNTER — HOSPITAL ENCOUNTER (OUTPATIENT)
Facility: HOSPITAL | Age: 54
Discharge: HOME | End: 2024-05-11
Payer: COMMERCIAL

## 2024-05-08 VITALS
DIASTOLIC BLOOD PRESSURE: 87 MMHG | HEIGHT: 68 IN | BODY MASS INDEX: 31.93 KG/M2 | TEMPERATURE: 98.4 F | SYSTOLIC BLOOD PRESSURE: 131 MMHG | HEART RATE: 100 BPM | RESPIRATION RATE: 20 BRPM | OXYGEN SATURATION: 95 %

## 2024-05-08 DIAGNOSIS — S42.255A NONDISPLACED FRACTURE OF GREATER TUBEROSITY OF LEFT HUMERUS, INITIAL ENCOUNTER FOR CLOSED FRACTURE: ICD-10-CM

## 2024-05-08 DIAGNOSIS — S42.295A OTHER CLOSED NONDISPLACED FRACTURE OF PROXIMAL END OF LEFT HUMERUS, INITIAL ENCOUNTER: ICD-10-CM

## 2024-05-08 DIAGNOSIS — S42.295A OTHER CLOSED NONDISPLACED FRACTURE OF PROXIMAL END OF LEFT HUMERUS, INITIAL ENCOUNTER: Primary | ICD-10-CM

## 2024-05-08 PROCEDURE — 99212 OFFICE O/P EST SF 10 MIN: CPT | Performed by: ORTHOPAEDIC SURGERY

## 2024-05-08 PROCEDURE — 73030 X-RAY EXAM OF SHOULDER: CPT

## 2024-05-08 ASSESSMENT — PATIENT HEALTH QUESTIONNAIRE - PHQ9
SUM OF ALL RESPONSES TO PHQ QUESTIONS 1-9: 0
SUM OF ALL RESPONSES TO PHQ9 QUESTIONS 1 & 2: 0
SUM OF ALL RESPONSES TO PHQ QUESTIONS 1-9: 0
1. LITTLE INTEREST OR PLEASURE IN DOING THINGS: NOT AT ALL
2. FEELING DOWN, DEPRESSED OR HOPELESS: NOT AT ALL

## 2024-05-08 NOTE — PROGRESS NOTES
Identified pt with two pt identifiers (name and ). Reviewed chart in preparation for visit and have obtained necessary documentation.    Kendra López is a 53 y.o. female  Chief Complaint   Patient presents with    Follow-up    Joint Pain     Left shoulder 2/10 pain scale      /87 (Site: Right Upper Arm, Position: Sitting, Cuff Size: Large Adult)   Pulse 100   Temp 98.4 °F (36.9 °C) (Oral)   Resp 20   Ht 1.727 m (5' 8\")   SpO2 95%   BMI 31.93 kg/m²     1. Have you been to the ER, urgent care clinic since your last visit?  Hospitalized since your last visit?NO    2. Have you seen or consulted any other health care providers outside of the [facility] since your last visit?  Include any pap smears or colon screening. yes    
x-rays or office evaluations will be necessary.  I told her if she has any problems with her shoulder to return for reevaluation.  Otherwise, she will follow-up as needed.  All questions were answered.

## 2024-06-03 ENCOUNTER — HOSPITAL ENCOUNTER (OUTPATIENT)
Facility: HOSPITAL | Age: 54
Discharge: HOME | End: 2024-06-06
Payer: COMMERCIAL

## 2024-06-03 DIAGNOSIS — M79.641 RIGHT HAND PAIN: ICD-10-CM

## 2024-06-03 DIAGNOSIS — M25.531 RIGHT WRIST PAIN: ICD-10-CM

## 2024-06-03 DIAGNOSIS — M79.641 RIGHT HAND PAIN: Primary | ICD-10-CM

## 2024-06-03 PROCEDURE — 73100 X-RAY EXAM OF WRIST: CPT

## 2024-06-03 PROCEDURE — 73130 X-RAY EXAM OF HAND: CPT

## 2024-06-05 ENCOUNTER — OFFICE VISIT (OUTPATIENT)
Age: 54
End: 2024-06-05
Payer: COMMERCIAL

## 2024-06-05 VITALS
OXYGEN SATURATION: 95 % | TEMPERATURE: 98.8 F | BODY MASS INDEX: 29.64 KG/M2 | SYSTOLIC BLOOD PRESSURE: 124 MMHG | HEART RATE: 94 BPM | WEIGHT: 195.55 LBS | HEIGHT: 68 IN | DIASTOLIC BLOOD PRESSURE: 85 MMHG

## 2024-06-05 DIAGNOSIS — M18.11 PRIMARY OSTEOARTHRITIS OF FIRST CARPOMETACARPAL JOINT OF RIGHT HAND: Primary | ICD-10-CM

## 2024-06-05 DIAGNOSIS — M77.8 TENDINITIS OF THUMB: ICD-10-CM

## 2024-06-05 DIAGNOSIS — M25.531 ACUTE PAIN OF RIGHT WRIST: ICD-10-CM

## 2024-06-05 PROCEDURE — 99213 OFFICE O/P EST LOW 20 MIN: CPT | Performed by: ORTHOPAEDIC SURGERY

## 2024-06-05 ASSESSMENT — PATIENT HEALTH QUESTIONNAIRE - PHQ9
SUM OF ALL RESPONSES TO PHQ QUESTIONS 1-9: 0
1. LITTLE INTEREST OR PLEASURE IN DOING THINGS: NOT AT ALL
SUM OF ALL RESPONSES TO PHQ QUESTIONS 1-9: 0
SUM OF ALL RESPONSES TO PHQ QUESTIONS 1-9: 0
2. FEELING DOWN, DEPRESSED OR HOPELESS: NOT AT ALL
SUM OF ALL RESPONSES TO PHQ QUESTIONS 1-9: 0
SUM OF ALL RESPONSES TO PHQ9 QUESTIONS 1 & 2: 0